# Patient Record
Sex: FEMALE | Race: WHITE | NOT HISPANIC OR LATINO | Employment: PART TIME | URBAN - METROPOLITAN AREA
[De-identification: names, ages, dates, MRNs, and addresses within clinical notes are randomized per-mention and may not be internally consistent; named-entity substitution may affect disease eponyms.]

---

## 2019-05-07 ENCOUNTER — OFFICE VISIT (OUTPATIENT)
Dept: OBGYN CLINIC | Age: 59
End: 2019-05-07
Payer: COMMERCIAL

## 2019-05-07 VITALS
BODY MASS INDEX: 27.11 KG/M2 | SYSTOLIC BLOOD PRESSURE: 120 MMHG | HEIGHT: 63 IN | WEIGHT: 153 LBS | DIASTOLIC BLOOD PRESSURE: 72 MMHG

## 2019-05-07 DIAGNOSIS — Z12.11 ENCOUNTER FOR SCREENING COLONOSCOPY: ICD-10-CM

## 2019-05-07 DIAGNOSIS — Z01.419 ENCOUNTER FOR GYNECOLOGICAL EXAMINATION WITHOUT ABNORMAL FINDING: Primary | ICD-10-CM

## 2019-05-07 DIAGNOSIS — Z78.0 ASYMPTOMATIC POSTMENOPAUSAL STATUS: ICD-10-CM

## 2019-05-07 PROCEDURE — 99386 PREV VISIT NEW AGE 40-64: CPT | Performed by: NURSE PRACTITIONER

## 2019-05-07 PROCEDURE — 87624 HPV HI-RISK TYP POOLED RSLT: CPT | Performed by: NURSE PRACTITIONER

## 2019-05-07 PROCEDURE — G0145 SCR C/V CYTO,THINLAYER,RESCR: HCPCS | Performed by: NURSE PRACTITIONER

## 2019-05-08 LAB
HPV HR 12 DNA CVX QL NAA+PROBE: NEGATIVE
HPV16 DNA CVX QL NAA+PROBE: NEGATIVE
HPV18 DNA CVX QL NAA+PROBE: NEGATIVE

## 2019-05-09 LAB
LAB AP GYN PRIMARY INTERPRETATION: NORMAL
Lab: NORMAL

## 2019-05-10 ENCOUNTER — TELEPHONE (OUTPATIENT)
Dept: GASTROENTEROLOGY | Facility: CLINIC | Age: 59
End: 2019-05-10

## 2019-05-31 ENCOUNTER — TELEPHONE (OUTPATIENT)
Dept: GASTROENTEROLOGY | Facility: CLINIC | Age: 59
End: 2019-05-31

## 2019-05-31 RX ORDER — FLUTICASONE PROPIONATE 50 MCG
1 SPRAY, SUSPENSION (ML) NASAL DAILY
COMMUNITY
End: 2021-12-14 | Stop reason: ALTCHOICE

## 2019-06-03 ENCOUNTER — ANESTHESIA EVENT (OUTPATIENT)
Dept: GASTROENTEROLOGY | Facility: HOSPITAL | Age: 59
End: 2019-06-03

## 2019-06-03 ENCOUNTER — ANESTHESIA (OUTPATIENT)
Dept: GASTROENTEROLOGY | Facility: HOSPITAL | Age: 59
End: 2019-06-03

## 2019-06-03 ENCOUNTER — HOSPITAL ENCOUNTER (OUTPATIENT)
Dept: GASTROENTEROLOGY | Facility: HOSPITAL | Age: 59
Setting detail: OUTPATIENT SURGERY
Discharge: HOME | End: 2019-06-03
Attending: INTERNAL MEDICINE | Admitting: INTERNAL MEDICINE
Payer: COMMERCIAL

## 2019-06-03 VITALS
RESPIRATION RATE: 20 BRPM | BODY MASS INDEX: 24.94 KG/M2 | HEART RATE: 54 BPM | OXYGEN SATURATION: 96 % | TEMPERATURE: 97.7 F | HEIGHT: 65 IN | SYSTOLIC BLOOD PRESSURE: 108 MMHG | WEIGHT: 149.69 LBS | DIASTOLIC BLOOD PRESSURE: 65 MMHG

## 2019-06-03 DIAGNOSIS — Z12.11 SCREENING FOR COLON CANCER: ICD-10-CM

## 2019-06-03 PROCEDURE — 45385 COLONOSCOPY W/LESION REMOVAL: CPT | Performed by: INTERNAL MEDICINE

## 2019-06-03 PROCEDURE — 88305 TISSUE EXAM BY PATHOLOGIST: CPT | Performed by: PATHOLOGY

## 2019-06-03 PROCEDURE — 45380 COLONOSCOPY AND BIOPSY: CPT | Performed by: INTERNAL MEDICINE

## 2019-06-03 RX ORDER — GLYCOPYRROLATE 0.2 MG/ML
INJECTION INTRAMUSCULAR; INTRAVENOUS AS NEEDED
Status: DISCONTINUED | OUTPATIENT
Start: 2019-06-03 | End: 2019-06-03 | Stop reason: SURG

## 2019-06-03 RX ORDER — PROPOFOL 10 MG/ML
INJECTION, EMULSION INTRAVENOUS AS NEEDED
Status: DISCONTINUED | OUTPATIENT
Start: 2019-06-03 | End: 2019-06-03 | Stop reason: SURG

## 2019-06-03 RX ORDER — SODIUM CHLORIDE, SODIUM LACTATE, POTASSIUM CHLORIDE, CALCIUM CHLORIDE 600; 310; 30; 20 MG/100ML; MG/100ML; MG/100ML; MG/100ML
INJECTION, SOLUTION INTRAVENOUS CONTINUOUS PRN
Status: DISCONTINUED | OUTPATIENT
Start: 2019-06-03 | End: 2019-06-03 | Stop reason: SURG

## 2019-06-03 RX ORDER — LIDOCAINE HYDROCHLORIDE 10 MG/ML
INJECTION, SOLUTION INFILTRATION; PERINEURAL AS NEEDED
Status: DISCONTINUED | OUTPATIENT
Start: 2019-06-03 | End: 2019-06-03 | Stop reason: SURG

## 2019-06-03 RX ADMIN — PROPOFOL 20 MG: 10 INJECTION, EMULSION INTRAVENOUS at 10:30

## 2019-06-03 RX ADMIN — PROPOFOL 20 MG: 10 INJECTION, EMULSION INTRAVENOUS at 10:26

## 2019-06-03 RX ADMIN — LIDOCAINE HYDROCHLORIDE 20 MG: 10 INJECTION, SOLUTION INFILTRATION; PERINEURAL at 10:19

## 2019-06-03 RX ADMIN — PROPOFOL 110 MG: 10 INJECTION, EMULSION INTRAVENOUS at 10:20

## 2019-06-03 RX ADMIN — SODIUM CHLORIDE, SODIUM LACTATE, POTASSIUM CHLORIDE, AND CALCIUM CHLORIDE: .6; .31; .03; .02 INJECTION, SOLUTION INTRAVENOUS at 09:57

## 2019-06-03 RX ADMIN — GLYCOPYRROLATE 0.1 MG: 0.2 INJECTION, SOLUTION INTRAMUSCULAR; INTRAVENOUS at 10:24

## 2019-06-03 RX ADMIN — PROPOFOL 30 MG: 10 INJECTION, EMULSION INTRAVENOUS at 10:23

## 2019-06-05 ENCOUNTER — TELEPHONE (OUTPATIENT)
Dept: GASTROENTEROLOGY | Facility: CLINIC | Age: 59
End: 2019-06-05

## 2021-12-14 ENCOUNTER — ANNUAL EXAM (OUTPATIENT)
Dept: OBGYN CLINIC | Age: 61
End: 2021-12-14
Payer: COMMERCIAL

## 2021-12-14 VITALS
SYSTOLIC BLOOD PRESSURE: 114 MMHG | BODY MASS INDEX: 24.83 KG/M2 | HEIGHT: 65 IN | WEIGHT: 149 LBS | DIASTOLIC BLOOD PRESSURE: 78 MMHG

## 2021-12-14 DIAGNOSIS — Z86.010 ENCOUNTER FOR COLONOSCOPY DUE TO HISTORY OF COLONIC POLYP: ICD-10-CM

## 2021-12-14 DIAGNOSIS — Z12.31 SCREENING MAMMOGRAM FOR BREAST CANCER: ICD-10-CM

## 2021-12-14 DIAGNOSIS — Z01.419 ENCOUNTER FOR GYNECOLOGICAL EXAMINATION WITHOUT ABNORMAL FINDING: Primary | ICD-10-CM

## 2021-12-14 DIAGNOSIS — Z78.0 ASYMPTOMATIC POSTMENOPAUSAL STATUS: ICD-10-CM

## 2021-12-14 DIAGNOSIS — Z12.11 ENCOUNTER FOR COLONOSCOPY DUE TO HISTORY OF COLONIC POLYP: ICD-10-CM

## 2021-12-14 PROBLEM — H52.222 REGULAR ASTIGMATISM, LEFT EYE: Status: ACTIVE | Noted: 2021-12-14

## 2021-12-14 PROBLEM — H25.819 COMBINED FORMS OF AGE-RELATED CATARACT: Status: ACTIVE | Noted: 2019-04-08

## 2021-12-14 PROCEDURE — 99396 PREV VISIT EST AGE 40-64: CPT | Performed by: NURSE PRACTITIONER

## 2021-12-14 PROCEDURE — 0503F POSTPARTUM CARE VISIT: CPT | Performed by: NURSE PRACTITIONER

## 2021-12-14 RX ORDER — ATORVASTATIN CALCIUM 10 MG/1
TABLET, FILM COATED ORAL
COMMUNITY
Start: 2021-12-01 | End: 2021-12-14 | Stop reason: ALTCHOICE

## 2021-12-14 RX ORDER — DIFLUPREDNATE 0.5 MG/ML
EMULSION OPHTHALMIC
COMMUNITY
Start: 2019-04-08 | End: 2021-12-14 | Stop reason: ALTCHOICE

## 2022-05-03 ENCOUNTER — PREP FOR PROCEDURE (OUTPATIENT)
Dept: GASTROENTEROLOGY | Facility: CLINIC | Age: 62
End: 2022-05-03

## 2022-05-03 ENCOUNTER — TELEPHONE (OUTPATIENT)
Dept: GASTROENTEROLOGY | Facility: CLINIC | Age: 62
End: 2022-05-03

## 2022-05-03 DIAGNOSIS — Z86.010 HISTORY OF COLON POLYPS: Primary | ICD-10-CM

## 2022-05-03 NOTE — TELEPHONE ENCOUNTER
Patient called would like to be a direct schedule  Patient is having no issues at this time  Patient on the 3 year recall   Please call [phone] ty

## 2022-05-03 NOTE — TELEPHONE ENCOUNTER
Spoke with the patient and she scheduled her colonoscopy  Prep instructions mailed to her home  Scheduled date of colonoscopy (as of today):6/20/22  Physician performing colonoscopy: Jude  Location of colonoscopy:[facility]  Bowel prep reviewed with patient:miralax/dulcolax  Instructions reviewed with patient by:Zuri MARTINEZ  Clearances: none      05/03/22  Screened by: Maricruz Bone    Referring Provider     Pre- Screening: There is no height or weight on file to calculate BMI  Has patient been referred for a routine screening Colonoscopy? yes  Is the patient between 39-70 years old? yes      Previous Colonoscopy yes   If yes:    Date: 06/3/19    Facility: [facility]    Reason:       SCHEDULING STAFF: If the patient is between 45yrs-49yrs, please advise patient to confirm benefits/coverage with their insurance company for a routine screening colonoscopy, some insurance carriers will only cover at [address] or older  If the patient is over 66years old, please schedule an office visit  Does the patient want to see a Gastroenterologist prior to their procedure OR are they having any GI symptoms? no    Has the patient been hospitalized or had abdominal surgery in the past 6 months? no    Does the patient use supplemental oxygen? no    Does the patient take Coumadin, Lovenox, Plavix, Elliquis, Xarelto, or other blood thinning medication? no    Has the patient had a stroke, cardiac event, or stent placed in the past year? no    SCHEDULING STAFF: If patient answers NO to above questions, then schedule procedure  If patient answers YES to above questions, then schedule office appointment  If patient is between 45yrs - 49yrs, please advise patient that we will have to confirm benefits & coverage with their insurance company for a routine screening colonoscopy

## 2022-06-01 ENCOUNTER — TELEPHONE (OUTPATIENT)
Dept: GASTROENTEROLOGY | Facility: CLINIC | Age: 62
End: 2022-06-01

## 2022-07-18 ENCOUNTER — HOSPITAL ENCOUNTER (OUTPATIENT)
Dept: GASTROENTEROLOGY | Facility: HOSPITAL | Age: 62
Setting detail: OUTPATIENT SURGERY
Discharge: HOME | End: 2022-07-18
Attending: INTERNAL MEDICINE | Admitting: INTERNAL MEDICINE
Payer: COMMERCIAL

## 2022-07-18 ENCOUNTER — ANESTHESIA (OUTPATIENT)
Dept: GASTROENTEROLOGY | Facility: HOSPITAL | Age: 62
End: 2022-07-18

## 2022-07-18 ENCOUNTER — ANESTHESIA EVENT (OUTPATIENT)
Dept: GASTROENTEROLOGY | Facility: HOSPITAL | Age: 62
End: 2022-07-18

## 2022-07-18 VITALS
BODY MASS INDEX: 24.65 KG/M2 | OXYGEN SATURATION: 97 % | HEIGHT: 65 IN | HEART RATE: 53 BPM | DIASTOLIC BLOOD PRESSURE: 63 MMHG | SYSTOLIC BLOOD PRESSURE: 108 MMHG | WEIGHT: 147.93 LBS | RESPIRATION RATE: 19 BRPM | TEMPERATURE: 97.2 F

## 2022-07-18 DIAGNOSIS — Z86.010 HISTORY OF COLON POLYPS: ICD-10-CM

## 2022-07-18 PROCEDURE — 88305 TISSUE EXAM BY PATHOLOGIST: CPT | Performed by: PATHOLOGY

## 2022-07-18 PROCEDURE — 45385 COLONOSCOPY W/LESION REMOVAL: CPT | Performed by: INTERNAL MEDICINE

## 2022-07-18 RX ORDER — LIDOCAINE HYDROCHLORIDE 10 MG/ML
INJECTION, SOLUTION EPIDURAL; INFILTRATION; INTRACAUDAL; PERINEURAL AS NEEDED
Status: DISCONTINUED | OUTPATIENT
Start: 2022-07-18 | End: 2022-07-18

## 2022-07-18 RX ORDER — SODIUM CHLORIDE, SODIUM LACTATE, POTASSIUM CHLORIDE, CALCIUM CHLORIDE 600; 310; 30; 20 MG/100ML; MG/100ML; MG/100ML; MG/100ML
INJECTION, SOLUTION INTRAVENOUS CONTINUOUS PRN
Status: DISCONTINUED | OUTPATIENT
Start: 2022-07-18 | End: 2022-07-18

## 2022-07-18 RX ORDER — PROPOFOL 10 MG/ML
INJECTION, EMULSION INTRAVENOUS AS NEEDED
Status: DISCONTINUED | OUTPATIENT
Start: 2022-07-18 | End: 2022-07-18

## 2022-07-18 RX ADMIN — PROPOFOL 30 MG: 10 INJECTION, EMULSION INTRAVENOUS at 12:35

## 2022-07-18 RX ADMIN — PROPOFOL 100 MG: 10 INJECTION, EMULSION INTRAVENOUS at 12:30

## 2022-07-18 RX ADMIN — PROPOFOL 20 MG: 10 INJECTION, EMULSION INTRAVENOUS at 12:41

## 2022-07-18 RX ADMIN — LIDOCAINE HYDROCHLORIDE 20 MG: 10 INJECTION, SOLUTION EPIDURAL; INFILTRATION; INTRACAUDAL; PERINEURAL at 12:30

## 2022-07-18 RX ADMIN — PROPOFOL 20 MG: 10 INJECTION, EMULSION INTRAVENOUS at 12:32

## 2022-07-18 RX ADMIN — PROPOFOL 30 MG: 10 INJECTION, EMULSION INTRAVENOUS at 12:38

## 2022-07-18 RX ADMIN — SODIUM CHLORIDE, SODIUM LACTATE, POTASSIUM CHLORIDE, AND CALCIUM CHLORIDE: .6; .31; .03; .02 INJECTION, SOLUTION INTRAVENOUS at 12:20

## 2022-07-18 NOTE — ANESTHESIA PREPROCEDURE EVALUATION
Procedure:  COLONOSCOPY         Physical Exam    Airway    Mallampati score: III  TM Distance: >3 FB  Neck ROM: full     Dental       Cardiovascular  Cardiovascular exam normal    Pulmonary  Pulmonary exam normal     Other Findings     Patient called would like to be a direct schedule  Patient is having no issues at this time  Patient on the 3 year recall  Anesthesia Plan  ASA Score- 2     Anesthesia Type- IV sedation with anesthesia with ASA Monitors  Additional Monitors:   Airway Plan:           Plan Factors-Exercise tolerance (METS): >4 METS  Chart reviewed  Existing labs reviewed  Patient summary reviewed  Patient is a current smoker  Induction- intravenous  Postoperative Plan-     Informed Consent- Anesthetic plan and risks discussed with patient  I personally reviewed this patient with the CRNA  Discussed and agreed on the Anesthesia Plan with the CRNA                  Surgical History     Current as of 07/18/22 1131  TOTAL HIP ARTHROPLASTY TUBAL LIGATION   TONSILLECTOMY      Substance History     Current as of 07/18/22 1131  Smoking Status: Current Some Day Smoker - 8 75 pack years   Smokeless Tobacco Status: Never Used   Alcohol use: Never   Drug use: Never     Problem List     Current as of 07/18/22 1131  Asymptomatic postmenopausal status   Combined forms of age-related cataract   Regular astigmatism, left eye

## 2022-07-18 NOTE — ANESTHESIA POSTPROCEDURE EVALUATION
Post-Op Assessment Note    CV Status:  Stable  Pain Score: 0    Pain management: adequate     Mental Status:  Arousable and sleepy   Hydration Status:  Euvolemic   PONV Controlled:  Controlled   Airway Patency:  Patent      Post Op Vitals Reviewed: Yes      Staff: CRNA         No complications documented      BP   105/56   Temp      Pulse 70   Resp 18   SpO2 98% RA

## 2022-07-18 NOTE — H&P
History and Physical - [facility]  [facility] 58 y o  female MRN: [id number]                  HPI: [facility] is a 58y o  year old female who presents for colonoscopy for history of colon polyps      REVIEW OF SYSTEMS: Per the HPI, and otherwise unremarkable  Historical Information   Past Medical History:   Diagnosis Date    No known health problems      Past Surgical History:   Procedure Laterality Date    TONSILLECTOMY      TOTAL HIP ARTHROPLASTY Right 02/09/2011    TUBAL LIGATION       Social History   Social History     Substance and Sexual Activity   Alcohol Use Never     Social History     Substance and Sexual Activity   Drug Use Never     Social History     Tobacco Use   Smoking Status Current Some Day Smoker    Packs/day: 0 25    Years: 35 00    Pack years: 8 75    Types: Cigarettes   Smokeless Tobacco Never Used     Family History   Problem Relation Age of Onset    Breast cancer Neg Hx     Colon cancer Neg Hx     Ovarian cancer Neg Hx     Uterine cancer Neg Hx     Cervical cancer Neg Hx        Meds/Allergies     (Not in a hospital admission)      Allergies   Allergen Reactions    Penicillins      Other reaction(s): Unknown Reaction  hives         Objective     Blood pressure 104/58, pulse 67, temperature 97 9 °F (36 6 °C), temperature source Temporal, resp  rate 18, height 5' 5" (1 651 m), weight 67 1 kg (147 lb 14 9 oz), SpO2 96 %, not currently breastfeeding  PHYSICAL EXAM    /58   Pulse 67   Temp 97 9 °F (36 6 °C) (Temporal)   Resp 18   Ht 5' 5" (1 651 m)   Wt 67 1 kg (147 lb 14 9 oz)   SpO2 96%   BMI 24 62 kg/m²       Gen: NAD  CV: RRR  CHEST: Clear  ABD: soft, NT/ND  EXT: no edema      ASSESSMENT/PLAN:  This is a 58y o  year old female here for colonoscopy, and she is stable and optimized for her procedure

## 2022-08-01 ENCOUNTER — TELEPHONE (OUTPATIENT)
Dept: GASTROENTEROLOGY | Facility: CLINIC | Age: 62
End: 2022-08-01

## 2022-08-01 NOTE — TELEPHONE ENCOUNTER
----- Message from Matthew Virgen PA-C sent at 8/1/2022  8:50 AM EDT -----  Please let patient know that Dr Haylee Miner completely removed precancerous polyps from the colon  She has recommended a 3 year recall for her colonoscopy  Thank you

## 2022-08-01 NOTE — TELEPHONE ENCOUNTER
Called and spoke to patient   Gave patient test results  Patient voiced understanding and had no further questions or concerns

## 2023-01-10 DIAGNOSIS — Z12.31 SCREENING MAMMOGRAM FOR BREAST CANCER: ICD-10-CM

## 2023-03-18 ENCOUNTER — APPOINTMENT (OUTPATIENT)
Dept: RADIOLOGY | Facility: CLINIC | Age: 63
End: 2023-03-18

## 2023-03-18 ENCOUNTER — OFFICE VISIT (OUTPATIENT)
Dept: OBGYN CLINIC | Facility: CLINIC | Age: 63
End: 2023-03-18

## 2023-03-18 VITALS
DIASTOLIC BLOOD PRESSURE: 76 MMHG | BODY MASS INDEX: 25.33 KG/M2 | HEIGHT: 65 IN | SYSTOLIC BLOOD PRESSURE: 111 MMHG | RESPIRATION RATE: 18 BRPM | OXYGEN SATURATION: 97 % | WEIGHT: 152 LBS | HEART RATE: 81 BPM

## 2023-03-18 DIAGNOSIS — M54.50 ACUTE LOW BACK PAIN, UNSPECIFIED BACK PAIN LATERALITY, UNSPECIFIED WHETHER SCIATICA PRESENT: ICD-10-CM

## 2023-03-18 DIAGNOSIS — M53.3 SI (SACROILIAC) JOINT DYSFUNCTION: Primary | ICD-10-CM

## 2023-03-18 NOTE — PROGRESS NOTES
Assessment/Plan:    No problem-specific Assessment & Plan notes found for this encounter  Diagnoses and all orders for this visit:    SI (sacroiliac) joint dysfunction  -     XR spine lumbar minimum 4 views non injury; Future  -     Ambulatory Referral to Pain Management; Future  -     Ambulatory Referral to Physical Therapy; Future    Other orders  -     Multiple Vitamins-Minerals (MENS 50+ MULTI VITAMIN/MIN PO); Take by mouth  -     Cholecalciferol (VITAMIN D3 PO); Take by mouth  -     Calcium-Magnesium-Vitamin D (CALCIUM MAGNESIUM PO); Take by mouth     Radiographs of the lumbar spine reviewed independently in office today degenerative changes were noted in the SI joint  Accentuation of nutation of the sacrum was noted  Follow-up on official reading  Clinical impression is that patient is symptomatic from SI joint dysfunction affecting the left joint  We discussed the recommended treatment plan and I am advising patient follow-up with physical therapy and consultation with spine and pain for possible SI joint injection  I advised patient that I would recommend following up with her again in about 6 to 8 weeks for reevaluation  If her pain symptoms continue persist we can consider advanced imaging for further evaluation  Subjective:      Patient ID: Francisco Whatley is a 61 y o  female presenting for initial evaluation in regards to lower back pain  Patient states that the pain began in September and originated in the lumbar spine and radiated down into the left gluteal region  She was seen by her chiropractor and had about 3-4 adjustments and states that the pain did resolve  She was advised by her Doctor to avoid any exercises during the month that she was receiving adjustments  She states that approximately 3 weeks after resuming exercise activity she started developing pain symptoms that fail to improve  Pain symptoms have been ongoing since November    States that it feels like a dull ache that begins in the left lower lumbar region and radiates into the left gluteal region specifically worsened with external rotation of the hip    She denies any numbness or tingling no bowel or bladder incontinence and no weakness in the lower extremity    HPI    The following portions of the patient's history were reviewed and updated as appropriate: allergies, current medications, past family history, past medical history, past social history, past surgical history and problem list     Review of Systems      Objective:      /76   Pulse 81   Resp 18   Ht 5' 5" (1 651 m)   Wt 68 9 kg (152 lb)   SpO2 97%   BMI 25 29 kg/m²          Physical Exam      Back exam  No gross abnormality  No skin changes open wounds or ecchymosis  There is no tenderness to palpation over the spinous processes of the lumbar spine and no tenderness palpation of the paraspinals of the lumbar spine  He does exhibit tenderness to palpation in the left SI joint  EWA testing does elicit reproduction of pain in the left SI joint  She has 5 out of 5 strength in lower extremity  Is able to perform heel and toe walk  No sensory deficits to light touch  Negative straight leg raise on the left

## 2023-03-23 ENCOUNTER — TELEPHONE (OUTPATIENT)
Dept: PAIN MEDICINE | Facility: CLINIC | Age: 63
End: 2023-03-23

## 2023-03-23 NOTE — TELEPHONE ENCOUNTER
Caller: patient     Doctor: Kathryn Jordan    Reason for call: pt has a direct referral from Orthopedics for a procedure   She's requesting to schedule it asap, thx    Call back#: [phone]

## 2023-03-24 ENCOUNTER — TELEPHONE (OUTPATIENT)
Dept: OBGYN CLINIC | Facility: CLINIC | Age: 63
End: 2023-03-24

## 2023-03-24 ENCOUNTER — TELEPHONE (OUTPATIENT)
Dept: OBGYN CLINIC | Facility: HOSPITAL | Age: 63
End: 2023-03-24

## 2023-03-24 NOTE — TELEPHONE ENCOUNTER
Caller: Patient     Doctor/Office: Dr Hobbs     Call regarding :  SPA     Call was transferred to: [facility]

## 2023-03-24 NOTE — TELEPHONE ENCOUNTER
Caller: Patient    Doctor: Bobbi Mcclelland    Reason for call: pt is requesting to schedule her procedure & is checking the status     Call back#: [phone]

## 2023-03-24 NOTE — TELEPHONE ENCOUNTER
Caller: Self    Doctor: Natanael Yip    Reason for call: patient received a message to discuss xray results, returning call    Call back#: [phone]

## 2023-03-27 ENCOUNTER — EVALUATION (OUTPATIENT)
Dept: PHYSICAL THERAPY | Facility: CLINIC | Age: 63
End: 2023-03-27

## 2023-03-27 DIAGNOSIS — M53.3 SI (SACROILIAC) JOINT DYSFUNCTION: Primary | ICD-10-CM

## 2023-03-27 NOTE — PROGRESS NOTES
PT Evaluation     Today's date: 3/27/2023  Patient name: Gypsy Hylton  : 1960  MRN: [id number]  Referring provider: Sophie Broderick DO  Dx:   Encounter Diagnosis     ICD-10-CM    1  SI (sacroiliac) joint dysfunction  M53 3 Ambulatory Referral to Physical Therapy          Start Time: 476  Stop Time: 1800  Total time in clinic (min): 45 minutes    Assessment  Assessment details: Pt is a 60 y/o female presenting to physical therapy with L lumbar and SI pain  Upon examination, pt presents with impairments of decreased strength, decreased ROM, and increased pain of pt's lumbar spine/SI joint resulting in limitations of self-care/ADLs, household/recreational activities, and ambulation  Pt plan of care will focus on improving strength and ROM of pt's lumbar spine/SI joint as well as decreasing pain and improving tolerance to functional activities  Pt would benefit from skilled physical therapy to facilitate a return to her prior level of function  Impairments: abnormal or restricted ROM, activity intolerance, impaired physical strength, lacks appropriate home exercise program and pain with function  Functional limitations: self-care/ADLs, household/recreational activities, and ambulation  Symptom irritability: moderateUnderstanding of Dx/Px/POC: good   Prognosis: good    Goals  STG - 4 weeks  1  Pt will have a subjective decrease in pain of her SI joint by 2 levels or more during ambulation  2  Pt will demonstrate independence with her HEP    LTG - 8 weeks  1  Pt will demonstrate 4/5 gross strength or better of her core and B hips in all planes to facilitate a return to her prior level of function  2  Pt's FOTO score will improve from (initial score) to (final predicted score) or better to facilitate a return to pt's prior level of function        Plan  Patient would benefit from: PT eval and skilled physical therapy  Planned modality interventions: cryotherapy, thermotherapy: hydrocollator packs and unattended electrical stimulation  Planned therapy interventions: activity modification, ADL training, abdominal trunk stabilization, body mechanics training, flexibility, functional ROM exercises, gait training, graded exercise, home exercise program, joint mobilization, manual therapy, massage, Muniz taping, neuromuscular re-education, patient education, postural training, self care, strengthening, stretching, therapeutic activities and therapeutic exercise  Frequency: 2x week  Duration in weeks: 8  Plan of Care beginning date: 3/27/2023  Plan of Care expiration date: 2023  Treatment plan discussed with: patient        Subjective Evaluation    History of Present Illness  Mechanism of injury: Pt is a 62 y/o female presenting to physical therapy with L lumbar and SI pain  Pt reports her pain started at the end of October which she did go to see her chiropractor in November which did help  However, the pain gradually returned by the middle of January but she is unsure why  Pt reports she would try and exercise which did not help much and finally she Dr Raya Rodas in March  Pt reports she has a constant on the L side of her back which will worsen with walking and doing pilates  Pt reports she will have decreased pain with ice, aleve, stretching, and sittng upright  Pain  Current pain ratin  At best pain ratin  At worst pain ratin  Location: L lumbar/SI  Quality: dull ache and discomfort  Relieving factors: medications, ice and rest  Aggravating factors: walking  Progression: worsening    Treatments  Previous treatment: medication and chiropractic  Current treatment: medication and physical therapy  Patient Goals  Patient goals for therapy: return to sport/leisure activities  Patient goal: walk without pain        Objective     Tenderness     Left Hip   Tenderness in the PSIS       Active Range of Motion     Lumbar   Flexion: 70 degrees  WFL  Extension: 20 degrees  Restriction level: moderate  Left "lateral flexion: 25 degrees    WFL  Right lateral flexion: 25 degrees  WFL  Mechanical Assessment    Cervical      Thoracic    Lying extension:   Pain intensity: better    Lumbar      Strength/Myotome Testing     Left Hip   Planes of Motion   Flexion: 4-  Extension: 3+  Abduction: 4-  Adduction: 4-  External rotation: 3+  Internal rotation: 3+    Right Hip   Planes of Motion   Flexion: 4-  Extension: 3+  Abduction: 4-  Adduction: 4-  External rotation: 4-  Internal rotation: 4-    Left Knee   Flexion: 4  Extension: 4    Right Knee   Flexion: 4  Extension: 4    General Comments:      Lumbar Comments  Pt demonstrated an upslip of her L ASIS and PSIS causing her LLE to be shorter than her RLE    Upslip corrected after providing long axis distraction to pt's LLE    Core strength: 3/5             Precautions: N/A      Manuals 3/27            Long axis distraction L hip GENOVEVA            PSIS megan TOMAS                                      Neuro Re-Ed             Education on POC, diagnosis, and HEP 8'                                                                                          Ther Ex             Prone hip ext 1x10 3\"            TA 1x10                                                                                          Ther Activity                                       Gait Training                                       Modalities                                          "

## 2023-03-27 NOTE — TELEPHONE ENCOUNTER
S/w pt scheduled SI joint 4/20/23 @  1pm, reviewed all procedural instructions, including /vaccination policy

## 2023-04-03 ENCOUNTER — OFFICE VISIT (OUTPATIENT)
Dept: PHYSICAL THERAPY | Facility: CLINIC | Age: 63
End: 2023-04-03

## 2023-04-03 DIAGNOSIS — M53.3 SI (SACROILIAC) JOINT DYSFUNCTION: Primary | ICD-10-CM

## 2023-04-03 NOTE — PROGRESS NOTES
"Daily Note     Today's date: 4/3/2023  Patient name: Myla Orr  : 1960  MRN: [id number]  Referring provider: Krista Penaloza DO  Dx:   Encounter Diagnosis     ICD-10-CM    1  SI (sacroiliac) joint dysfunction  M53 3           Start Time: 1100  Stop Time: 1147  Total time in clinic (min): 47 minutes    Subjective: Pt reports having decreased pain in her SI after her initial evaluation  Objective: See treatment diary below      Assessment: Tolerated treatment well  Patient demonstrated fatigue post treatment, exhibited good technique with therapeutic exercises and would benefit from continued PT  Pt had significant limitations in mobility of her L hip in the planes of abduction and IR, pt also had increased pain with PROM hip IR  Pt had decreased pain after performance SI gapping and repeated hip extension at today's session  Pt required min verbal cues to facilitate performance of proper technique  Assess pt response to treatment at next visit  Plan: Continue per plan of care  Precautions: N/A      Manuals 3/27 4/3           Long axis distraction L hip GENOVEVA            PSIS rocking GENOVEVA GENOVEVA           sidelying SI gapping  GENOVEVA           PROM hip IR  GENOVEVA           Hip add stretching  GENOVEVA           Neuro Re-Ed             Pt education 8' 4'            c hip add  2x10           TA  1x10           TA c march  1x10 ea           Up up down down  1x10 p!                                      Ther Ex             Prone hip ext 1x10 3\" 1x10 5\" ea           Prone press up  1x10           Half kneeling repeated hip extension  1x10           Reverse clamshell  2x10                                                               Ther Activity                                       Gait Training                                       Modalities                                            "

## 2023-04-06 ENCOUNTER — OFFICE VISIT (OUTPATIENT)
Dept: PHYSICAL THERAPY | Facility: CLINIC | Age: 63
End: 2023-04-06

## 2023-04-06 DIAGNOSIS — M53.3 SI (SACROILIAC) JOINT DYSFUNCTION: Primary | ICD-10-CM

## 2023-04-06 NOTE — PROGRESS NOTES
"Daily Note     Today's date: 2023  Patient name: Raymond Martinez  : 1960  MRN: [id number]  Referring provider: Saige Eldridge DO  Dx:   Encounter Diagnosis     ICD-10-CM    1  SI (sacroiliac) joint dysfunction  M53 3           Start Time: 1100  Stop Time: 6612  Total time in clinic (min): 45 minutes    Subjective: Pt reports she is feeling better and had no pain in her SI/low back last night  Objective: See treatment diary below      Assessment: Tolerated treatment well  Patient demonstrated fatigue post treatment, exhibited good technique with therapeutic exercises and would benefit from continued PT  Pt was able to progress today with inclusion of paloff press and sidestepping into pt's exercise program  Pt had improved hip IR and decreased pain during performance of hip PROM  Pt required min verbal cues to facilitate performance of proper technique  Assess pt response to treatment at next visit  Plan: Continue per plan of care        Precautions: N/A      Manuals 3/27 4/3 4/6          Long axis distraction L hip GENOVEVA  GENOVEVA          PSIS rocking GENOVEVA GENOVEVA GENOVEVA          sidelying SI gapping  GENOVEVA GENOVEVA          PROM hip IR  GENOVEVA GENOVEVA          Hip add stretching  GENOVEVA GENOVEVA          Neuro Re-Ed             Pt education 8' 4' 4'          Bridges c hip add  2x10 2x10          TA  1x10 1x10          TA c march  1x10 ea 1x15 ea          Up up down down  1x10 p!           paloff press   1x5 10\" blue                       Ther Ex             Prone hip ext 1x10 3\" 1x10 5\" ea 1x15 5\" ea          Prone press up  1x10 2x10          Half kneeling repeated hip extension  1x10 2x10          Reverse clamshell  2x10 2x10          Treadmill for LE strength and endurance   5'          Sidestepping at bar   2x grn                                    Ther Activity                                       Gait Training                                       Modalities                                            "

## 2023-04-20 ENCOUNTER — HOSPITAL ENCOUNTER (OUTPATIENT)
Dept: RADIOLOGY | Facility: CLINIC | Age: 63
Discharge: HOME | End: 2023-04-20

## 2023-04-20 VITALS
DIASTOLIC BLOOD PRESSURE: 78 MMHG | HEART RATE: 74 BPM | RESPIRATION RATE: 20 BRPM | SYSTOLIC BLOOD PRESSURE: 121 MMHG | TEMPERATURE: 97 F | OXYGEN SATURATION: 95 %

## 2023-04-20 DIAGNOSIS — M53.3 SI (SACROILIAC) JOINT DYSFUNCTION: ICD-10-CM

## 2023-04-20 RX ORDER — METHYLPREDNISOLONE ACETATE 40 MG/ML
40 INJECTION, SUSPENSION INTRA-ARTICULAR; INTRALESIONAL; INTRAMUSCULAR; PARENTERAL; SOFT TISSUE ONCE
Status: COMPLETED | OUTPATIENT
Start: 2023-04-20 | End: 2023-04-20

## 2023-04-20 RX ORDER — BUPIVACAINE HCL/PF 2.5 MG/ML
1 VIAL (ML) INJECTION ONCE
Status: COMPLETED | OUTPATIENT
Start: 2023-04-20 | End: 2023-04-20

## 2023-04-20 RX ADMIN — Medication 1 ML: at 13:13

## 2023-04-20 RX ADMIN — METHYLPREDNISOLONE ACETATE 40 MG: 40 INJECTION, SUSPENSION INTRA-ARTICULAR; INTRALESIONAL; INTRAMUSCULAR; PARENTERAL; SOFT TISSUE at 13:13

## 2023-04-20 RX ADMIN — IOHEXOL 0.5 ML: 300 INJECTION, SOLUTION INTRAVENOUS at 13:13

## 2023-04-20 NOTE — DISCHARGE INSTR - LAB
Steroid Joint Injection   WHAT YOU NEED TO KNOW:   A steroid joint injection is a procedure to inject steroid medicine into a joint  Steroid medicine decreases pain and inflammation  The injection may also contain an anesthetic (numbing medicine) to decrease pain  It may be done to treat conditions such as arthritis, gout, or carpal tunnel syndrome  The injections may be given in your knee, ankle, shoulder, elbow, wrist, ankle or sacroiliac joint  Do not apply heat to any area that is numb  If you have discomfort or soreness at the injection site, you may apply ice today, 20 minutes on and 20 minutes off  Tomorrow you may use ice or warm, moist heat  Do not apply ice or heat directly to the skin  You may have an increase or change in the discomfort for 36-48 hours after your treatment  Apply ice and continue with any pain medicine you have been prescribed  Do not do anything strenuous today  You may shower, but no tub baths or hot tubs today  You may resume your normal activities tomorrow, but do not “overdo it”  Resume normal activities slowly when you are feeling better  If you experience redness, drainage or swelling at the injection site, or if you develop a fever above 100 degrees, please call [facility] at [phone] or go to the Emergency Room  Continue to take all routine medicines prescribed by your primary care physician unless otherwise instructed by our staff  Most blood thinners should be started again according to your regularly scheduled dosing  If you have any questions, please give our office a call  As no general anesthesia was used in today's procedure, you should not experience any side effects related to anesthesia  If you are diabetic, the steroids used in today's injection may temporarily increase your blood sugar levels after the first few days after your injection   Please keep a close eye on your sugars and alert the doctor who manages your diabetes if your sugars are significantly high from your baseline or you are symptomatic  If you have a problem specifically related to your procedure, please call our office at [phone]  Problems not related to your procedure should be directed to your primary care physician

## 2023-04-20 NOTE — H&P
History of Present Illness: The patient is a 61 y o  female who presents with complaints of left low back pain and buttock pain    Past Medical History:   Diagnosis Date   • No known health problems        Past Surgical History:   Procedure Laterality Date   • TONSILLECTOMY     • TOTAL HIP ARTHROPLASTY Right 02/09/2011   • TUBAL LIGATION           Current Outpatient Medications:   •  Calcium-Magnesium-Vitamin D (CALCIUM MAGNESIUM PO), Take by mouth, Disp: , Rfl:   •  Cholecalciferol (VITAMIN D3 PO), Take by mouth, Disp: , Rfl:   •  Multiple Vitamins-Minerals (MENS 50+ MULTI VITAMIN/MIN PO), Take by mouth, Disp: , Rfl:     Allergies   Allergen Reactions   • Penicillins      Other reaction(s): Unknown Reaction  hives         Physical Exam: There were no vitals filed for this visit  General: Awake, Alert, Oriented x 3, Mood and affect appropriate  Respiratory: Respirations even and unlabored  Cardiovascular: Peripheral pulses intact; no edema  Musculoskeletal Exam: ttp left SI joint    ASA Score: 3         Assessment:   1  SI (sacroiliac) joint dysfunction        Plan:    Left SI joint injection

## 2023-04-24 ENCOUNTER — OFFICE VISIT (OUTPATIENT)
Dept: PHYSICAL THERAPY | Facility: CLINIC | Age: 63
End: 2023-04-24

## 2023-04-24 DIAGNOSIS — M53.3 SI (SACROILIAC) JOINT DYSFUNCTION: Primary | ICD-10-CM

## 2023-04-24 NOTE — PROGRESS NOTES
"Daily Note     Today's date: 2023  Patient name: Coretta Murray  : 1960  MRN: [id number]  Referring provider: Viviana Rick DO  Dx:   Encounter Diagnosis     ICD-10-CM    1  SI (sacroiliac) joint dysfunction  M53 3           Start Time: 1545  Stop Time: 1630  Total time in clinic (min): 45 minutes    Subjective: Pt reports receiving her SI injection last Thursday and having decreased pain at the start of therapy today  Objective: See treatment diary below      Assessment: Tolerated treatment well  Patient demonstrated fatigue post treatment, exhibited good technique with therapeutic exercises and would benefit from continued PT  Pt was able to progress today with inclusion of STS and standing hip abduction with resistance into pt's exercise program  Pt required min verbal cues to facilitate performance of proper technique  Assess pt response to treatment at next visit  Plan: Continue per plan of care  Precautions: N/A      Manuals 3/27 4/3 4/6 4/10 4/13 4/17 4/24      Long axis distraction L hip GENOVEVA  GENOVEVA GENOVEVA GENOVEVA        PSIS rocking GENOVEVA GENOVEVA GENOVEVA GENOVEVA GENOVEVA GENOVEVA GENOVEVA      sidelying SI gapping  GENOVEVA GENOVEVA GENOVEVA         PROM hip IR  GENOVEVA GENOVEVA GENOVEVA         Belted hip IR mobilization    GENOVEVA GENOVEVA GENOVEVA GENOVEVA      Hip add stretching  GENOVEVA GENOVEVA GENOVEVA         Neuro Re-Ed             Pt education 8' 4' 4' 4' 4'  4'      Bridges c hip add  2x10 2x10 2x10 3x10 3x10  3x10      TA  1x10 1x10 1x10  1x10 10\"       TA c march  1x10 ea 1x15 ea          Up up down down  1x10 p!   2x10 2x10 2x10  2x10      Reverse March             palo press   1x5 10\" blue 1x5 10\" blue 1x5 10\" blue NV 1x5 10\" blue                   Ther Ex             Prone hip ext 1x10 3\" 1x10 5\" ea 1x15 5\" ea 2x10 5\" ea 2x10 5\" ea 2x10 5\"  2x10 5\"      Prone press up  1x10 2x10 2x10 2x10 2x10 2x10      Half kneeling repeated hip extension (hip flex str position)  1x10 2x10 2x10 2x10 2x10  2x10      Reverse clamshell  2x10 2x10 3x10 2x10 orange 2x10 [place]ach 2x10 orange      Standing " "hip abd       2x10 grn      Sidestepping at bar   2x grn 2x grn 2x grn 2x grn  3x grn      sidelying hip abd    3x10 3x10 2x15 2x15      Seated hip hinge      1x10       Captain francesca     1x10 5\" 1x10 5\" 2x10 5\"      Ther Activity             STS from chair       2x10                    Gait Training                                       Modalities                                            "

## 2023-04-27 ENCOUNTER — OFFICE VISIT (OUTPATIENT)
Dept: OBGYN CLINIC | Facility: CLINIC | Age: 63
End: 2023-04-27

## 2023-04-27 VITALS
HEART RATE: 80 BPM | OXYGEN SATURATION: 97 % | HEIGHT: 65 IN | SYSTOLIC BLOOD PRESSURE: 112 MMHG | BODY MASS INDEX: 24.99 KG/M2 | DIASTOLIC BLOOD PRESSURE: 74 MMHG | WEIGHT: 150 LBS | RESPIRATION RATE: 18 BRPM

## 2023-04-27 DIAGNOSIS — M51.36 DEGENERATIVE DISC DISEASE, LUMBAR: ICD-10-CM

## 2023-04-27 DIAGNOSIS — M16.12 PRIMARY OSTEOARTHRITIS OF ONE HIP, LEFT: Primary | ICD-10-CM

## 2023-04-27 RX ORDER — VIT C/B6/B5/MAGNESIUM/HERB 173 50-5-6-5MG
CAPSULE ORAL
COMMUNITY

## 2023-04-28 ENCOUNTER — APPOINTMENT (OUTPATIENT)
Dept: PHYSICAL THERAPY | Facility: CLINIC | Age: 63
End: 2023-04-28

## 2023-05-01 ENCOUNTER — OFFICE VISIT (OUTPATIENT)
Dept: PHYSICAL THERAPY | Facility: CLINIC | Age: 63
End: 2023-05-01

## 2023-05-01 DIAGNOSIS — M53.3 SI (SACROILIAC) JOINT DYSFUNCTION: Primary | ICD-10-CM

## 2023-05-01 RX ORDER — BUPIVACAINE HYDROCHLORIDE 2.5 MG/ML
8 INJECTION, SOLUTION INFILTRATION; PERINEURAL
Status: COMPLETED | OUTPATIENT
Start: 2023-05-01 | End: 2023-05-01

## 2023-05-01 RX ORDER — TRIAMCINOLONE ACETONIDE 40 MG/ML
40 INJECTION, SUSPENSION INTRA-ARTICULAR; INTRAMUSCULAR
Status: COMPLETED | OUTPATIENT
Start: 2023-05-01 | End: 2023-05-01

## 2023-05-01 RX ADMIN — TRIAMCINOLONE ACETONIDE 40 MG: 40 INJECTION, SUSPENSION INTRA-ARTICULAR; INTRAMUSCULAR at 14:16

## 2023-05-01 RX ADMIN — BUPIVACAINE HYDROCHLORIDE 8 ML: 2.5 INJECTION, SOLUTION INFILTRATION; PERINEURAL at 14:16

## 2023-05-01 NOTE — PROGRESS NOTES
"Daily Note     Today's date: 2023  Patient name: Salud Cormier  : 1960  MRN: [id number]  Referring provider: Chris Brown DO  Dx:   Encounter Diagnosis     ICD-10-CM    1  SI (sacroiliac) joint dysfunction  M53 3                      Subjective: Patient states she had her injection on her hip on thurs and her back feels better, she is noting some L hip flexor irritation and she wonders if it from the injection  Objective: See treatment diary below      Assessment: Tolerated treatment well  Progressed to reverse marches, she was challenged to maintain 90/90 position but completed with no pain or discomfort  Hold times added to bridges with good effort  L hip flexor discomfort with lateral stepping to R  Cont challenge with reps and resistance of rest of charted program   Patient demonstrated fatigue post treatment and would benefit from continued PT      Plan: Progress treatment as tolerated  Precautions: N/A      Manuals 3/27 4/3 4/6 4/10 4/13 4/17 4/24 5/1     Long axis distraction L hip GENOVEVA  GENOVEVA GENOVEVA GENOVEVA        PSIS rocking GENOVEVA GENOVEVA GENOVEVA GENOVEVA GENOVEVA GENOVEVA GENOVEVA      sidelying SI gapping  GENOVEVA GENOVEVA GENOVEVA         PROM hip IR  GENOVEVA GENOVEVA GENOVEVA         Belted hip IR mobilization    GENOVEVA GENOVEVA GENOVEVA GENOVEVA      Hip add stretching  GENOVEVA GENOVEVA GENOVEVA         Neuro Re-Ed             Pt education 8' 4' 4' 4' 4'  4'      Bridges c hip add  2x10 2x10 2x10 3x10 3x10  3x10 3x10 3\"     TA  1x10 1x10 1x10  1x10 10\"       TA c march  1x10 ea 1x15 ea          Up up down down  1x10 p!   2x10 2x10 2x10  2x10 2x10     90/90 Pullover             Reverse March        1x10 ea     paloff press   1x5 10\" blue 1x5 10\" blue 1x5 10\" blue NV 1x5 10\" blue 1x6 10\" Blue minisquat position                  Ther Ex             Prone hip ext 1x10 3\" 1x10 5\" ea 1x15 5\" ea 2x10 5\" ea 2x10 5\" ea 2x10 5\"  2x10 5\" 2x10 5\" ea alt      Prone press up  1x10 2x10 2x10 2x10 2x10 2x10 2x10     Half kneeling repeated hip extension (hip flex str position)  1x10 2x10 2x10 2x10 2x10  2x10 2x10 " "ea     Reverse clamshell  2x10 2x10 3x10 2x10 orange 2x10 [place]ach 2x10 orange 2x10 ea OTB     Standing hip abd       2x10 grn 2x12 grn      Sidestepping at bar   2x grn 2x grn 2x grn 2x grn  3x grn 3x grn  No UE     sidelying hip abd    3x10 3x10 2x15 2x15      Seated hip hinge      1x10       Captain francesca     1x10 5\" 1x10 5\" 2x10 5\" 2x10 5\"     Ther Activity             STS from chair       2x10  2x10                   Gait Training                                       Modalities                                            "

## 2023-05-01 NOTE — PROGRESS NOTES
"Assessment/Plan:    No problem-specific Assessment & Plan notes found for this encounter  Diagnoses and all orders for this visit:    Primary osteoarthritis of one hip, left    Degenerative disc disease, lumbar    Other orders  -     Turmeric 500 MG CAPS; Take by mouth     We discussed that patient's pain symptoms seem to be primarily originating from lumbar etiology  She does have a component of advanced osteoarthritis involving the left hip and we discussed that this may be a possibility for her pain however more likely due to underlying lumbar osteoarthritis and SI joint degenerative changes  After counseling patient would like to trial a ultrasound-guided hip injection in office today which was performed without complication  I advised patient to follow-up with me again in a few weeks for reevaluation and if pain symptoms continue persist the next step would be to have her see spine pain for evaluation of the lumbar spine  Subjective:      Patient ID: Annabella Fuentes is a 61 y o  female presenting for office visit follow-up for left back hip pain  Patient was referred to physical therapy and referred to pain management for SI joint injection  Patient states the injection did help and physical therapy has been helping additionally  Pain has improved since initial evaluation date\however she still has significant pain in the posterior aspect left side lower lumbar      HPI    The following portions of the patient's history were reviewed and updated as appropriate: allergies, current medications, past family history, past medical history, past social history, past surgical history and problem list     Review of Systems      Objective:      /74   Pulse 80   Resp 18   Ht 5' 5\" (1 651 m)   Wt 68 kg (150 lb)   SpO2 97%   BMI 24 96 kg/m²          Physical Exam    Left hip exam elicits limitations with internal rotation however no pain with this motion  Negative scour  Patient exhibits positive " pain in the SI joint with EWA testing

## 2023-05-01 NOTE — PROGRESS NOTES
Large joint arthrocentesis: L hip joint  Universal Protocol:  Consent: Verbal consent obtained  Risks and benefits: risks, benefits and alternatives were discussed  Consent given by: patient    Supporting Documentation  Indications: pain   Procedure Details  Location: hip - L hip joint  Needle size: 22 G  Ultrasound guidance: yes  Approach: anterolateral  Medications administered: 8 mL bupivacaine 0 25 %; 40 mg triamcinolone acetonide 40 mg/mL    Patient tolerance: patient tolerated the procedure well with no immediate complications    Risks and benefits of CSI were discussed with patient extensively  Risks were highlighted which included but were not limited to infection, pain, local site swelling, and chance that injection may not be effective  Patient was also counseled regarding glucose elevation days after receiving CSI and to be mindful of diet and check sugars daily  Patient agreeable to proceed with CSI after counseling  Ultrasound images were saved to the [facility] and a small video clip showing injectate flowing into the hip joint was also saved  These will be uploaded into the PACS system

## 2023-05-04 ENCOUNTER — OFFICE VISIT (OUTPATIENT)
Dept: PHYSICAL THERAPY | Facility: CLINIC | Age: 63
End: 2023-05-04

## 2023-05-04 DIAGNOSIS — M53.3 SI (SACROILIAC) JOINT DYSFUNCTION: Primary | ICD-10-CM

## 2023-05-04 NOTE — PROGRESS NOTES
"Daily Note     Today's date: 2023  Patient name: Sebastian Ann  : 1960  MRN: [id number]  Referring provider: Army Carter DO  Dx:   Encounter Diagnosis     ICD-10-CM    1  SI (sacroiliac) joint dysfunction  M53 3                      Subjective: ***      Objective: See treatment diary below      Assessment: Tolerated treatment {Tolerated treatment :2994300069}  Patient {assessment:1673131711}      Plan: {PLAN:4334599785}     Precautions: N/A      Manuals 3/27 4/3 4/6 4/10 4/13 4/17 4/24 5/1     Long axis distraction L hip GENOVEVA  GENOVEVA GENOVEVA GENOVEVA        PSIS rocking GENOVEVA GENOVEVA GENOVEVA GENOVEVA GENOVEVA GENOVEVA GENOVEVA      sidelying SI gapping  GENOVEVA GENOVEVA GENOVEVA         PROM hip IR  GENOVEVA GENOVEVA GENOVEVA         Belted hip IR mobilization    GENOVEVA GENOVEVA GENOVEVA GENOVEVA      Hip add stretching  GENOVEVA GENOVEVA GENOVEVA         Neuro Re-Ed             Pt education 8' 4' 4' 4' 4'  4'      Bridges c hip add  2x10 2x10 2x10 3x10 3x10  3x10 3x10 3\"     TA  1x10 1x10 1x10  1x10 10\"       TA c march  1x10 ea 1x15 ea          Up up down down  1x10 p!   2x10 2x10 2x10  2x10 2x10     90/90 Pullover             Reverse March        1x10 ea     paloff press   1x5 10\" blue 1x5 10\" blue 1x5 10\" blue NV 1x5 10\" blue 1x6 10\" Blue minisquat position                  Ther Ex             Prone hip ext 1x10 3\" 1x10 5\" ea 1x15 5\" ea 2x10 5\" ea 2x10 5\" ea 2x10 5\"  2x10 5\" 2x10 5\" ea alt      Prone press up  1x10 2x10 2x10 2x10 2x10 2x10 2x10     Half kneeling repeated hip extension (hip flex str position)  1x10 2x10 2x10 2x10 2x10  2x10 2x10 ea     Reverse clamshell  2x10 2x10 3x10 2x10 orange 2x10 [place]ach 2x10 orange 2x10 ea OTB     Standing hip abd       2x10 grn 2x12 grn      Sidestepping at bar   2x grn 2x grn 2x grn 2x grn  3x grn 3x grn  No UE     sidelying hip abd    3x10 3x10 2x15 2x15      Seated hip hinge      1x10       Captain morgans     1x10 5\" 1x10 5\" 2x10 5\" 2x10 5\"     Ther Activity             STS from chair       2x10  2x10                   Gait Training                                       Modalities  "

## 2023-05-04 NOTE — PROGRESS NOTES
"Daily Note     Today's date: 2023  Patient name: Luciano Mccullough  : 1960  MRN: [id number]  Referring provider: Lin Bond DO  Dx:   Encounter Diagnosis     ICD-10-CM    1  SI (sacroiliac) joint dysfunction  M53 3                      Subjective: Pt reports that she has been feeling better the last 3 days  Objective: See treatment diary below      Assessment: Tolerated treatment well  Patient exhibited good technique with therapeutic exercises and would benefit from continued PT  When discussing HEP with pt, pt reported that she was performing all of program everyday  Instructed pt on having rest days in order to build muscular strength  Additionally instructed pt to increase frequency of prone press ups with exhale  Pt verbalized and demonstrated understanding  Continue to have IR restrictions consider adding IR based with rep hip ext if pain and motion loss continue to persist      Plan: Continue per plan of care  Progress treatment as tolerated  Precautions: N/A      Manuals 3/27 4/3 4/6 4/10 4/13 4/17 4/24 5/1 5/4    Long axis distraction L hip GENOVEVA  GENOVEVA GENOVEVA GENOVEVA        PSIS rocking GENOVEVA GENOVEVA GENOVEVA GENOVEVA GENOVEVA GENOVEVA GENOVEVA  KB    sidelying SI gapping  GENOVEVA GENOVEVA GENOVEVA         PROM hip IR  GENOVEVA GENOVEVA GENOVEVA         Belted hip IR mobilization    GENOVEVA GENOVEVA GENOVEVA GENOVEVA  KB    Hip add stretching  GENOVEVA GENOVEVA GENOVEVA         Neuro Re-Ed             Pt education 8' 4' 4' 4' 4'  4'      Bridges c hip add  2x10 2x10 2x10 3x10 3x10  3x10 3x10 3\" 3x10 3\"    TA  1x10 1x10 1x10  1x10 10\"       TA c march  1x10 ea 1x15 ea          Up up down down  1x10 p!   2x10 2x10 2x10  2x10 2x10 2x10    90/90 Pullover             Reverse March        1x10 ea 1x10 ea    paloff press   1x5 10\" blue 1x5 10\" blue 1x5 10\" blue NV 1x5 10\" blue 1x6 10\" Blue minisquat position 1x6 10\" Blue minisquat position                 Ther Ex             Prone hip ext 1x10 3\" 1x10 5\" ea 1x15 5\" ea 2x10 5\" ea 2x10 5\" ea 2x10 5\"  2x10 5\" 2x10 5\" ea alt  2x10 5\" ea alt     Prone press up  1x10 " "2x10 2x10 2x10 2x10 2x10 2x10 2x10 (+ exhale)    Half kneeling repeated hip extension (hip flex str position)  1x10 2x10 2x10 2x10 2x10  2x10 2x10 ea 2x10 ea    Reverse clamshell  2x10 2x10 3x10 2x10 orange 2x10 [place] 2x10 orange 2x10 ea OTB 2x10 ea OTB    Standing hip abd       2x10 grn 2x12 grn  2x12 grn     Sidestepping at bar   2x grn 2x grn 2x grn 2x grn  3x grn 3x grn  No UE 3x grn  No UE    sidelying hip abd    3x10 3x10 2x15 2x15      Seated hip hinge      1x10       Captain morgans     1x10 5\" 1x10 5\" 2x10 5\" 2x10 5\" NV    Ther Activity             STS from chair       2x10  2x10  NV                 Gait Training                                       Modalities                                            "

## 2023-05-08 ENCOUNTER — OFFICE VISIT (OUTPATIENT)
Dept: PHYSICAL THERAPY | Facility: CLINIC | Age: 63
End: 2023-05-08

## 2023-05-08 DIAGNOSIS — M53.3 SI (SACROILIAC) JOINT DYSFUNCTION: Primary | ICD-10-CM

## 2023-05-08 NOTE — PROGRESS NOTES
"Daily Note     Today's date: 2023  Patient name: Lluvia Garcia  : 1960  MRN: [id number]  Referring provider: Aparna Oliva DO  Dx:   Encounter Diagnosis     ICD-10-CM    1  SI (sacroiliac) joint dysfunction  M53 3           Start Time: 1100  Stop Time: 1146  Total time in clinic (min): 46 minutes    Subjective: Pt reports she is having slight pain in her low back at the start of therapy today, however, she continues to feel much better  Objective: See treatment diary below      Assessment: Tolerated treatment well  Patient demonstrated fatigue post treatment, exhibited good technique with therapeutic exercises and would benefit from continued PT  Pt was able to progress today by increasing sets and reps for her standing hip abduction and reverse march exercises  Pt required min verbal cues to facilitate performance of proper technique  Assess pt response to treatment at next visit  Plan: Continue per plan of care  Precautions: N/A      Manuals 3/27 4/3 4/ 4/10 4/13 4/17 4/24 5/1 5/4 5/8   Long axis distraction L hip GENOVEVA  GENOVEVA GENOVEVA GENOVEVA        PSIS rocking GENOVEVA GENOVEVA GENOVEVA GENOVEVA GENOVEVA GENOVEVA GENOVEVA  KB GENOVEVA   sidelying SI gapping  GENOVEVA GENOVEVA GENOVEVA         PROM hip IR  GENOVEVA GENOVEVA GENOVEVA         Belted hip IR mobilization    GENOVEVA GENOVEVA GENOVEVA GENOVEVA  KB GENOVEVA   Hip add stretching  GENOVEVA GENOVEVA GENOVEVA         Neuro Re-Ed             Pt education 8' 4' 4' 4' 4'  4'   4'   Bridges c hip add  2x10 2x10 2x10 3x10 3x10  3x10 3x10 3\" 3x10 3\" 3x10 3\"   TA  1x10 1x10 1x10  1x10 10\"       TA c march  1x10 ea 1x15 ea          Up up down down  1x10 p!   2x10 2x10 2x10  2x10 2x10 2x10 2x10   90/90 Pullover             Reverse March        1x10 ea 1x10 ea 2x10 ea   paloff press   1x5 10\" blue 1x5 10\" blue 1x5 10\" blue NV 1x5 10\" blue 1x6 10\" Blue minisquat position 1x6 10\" Blue minisquat position 1x6 10\" Blue minisquat position                Ther Ex             Prone hip ext 1x10 3\" 1x10 5\" ea 1x15 5\" ea 2x10 5\" ea 2x10 5\" ea 2x10 5\"  2x10 5\" 2x10 5\" ea alt  2x10 5\" ea alt  " "2x10 5\" ea alt   Prone press up  1x10 2x10 2x10 2x10 2x10 2x10 2x10 2x10 (+ exhale) 2x10 (+ exhale)   Half kneeling repeated hip extension (hip flex str position)  1x10 2x10 2x10 2x10 2x10  2x10 2x10 ea 2x10 ea 2x10 ea   Reverse clamshell  2x10 2x10 3x10 2x10 orange 2x10 [place] 2x10 orange 2x10 ea OTB 2x10 ea OTB 2x10 ea   Standing hip abd       2x10 grn 2x12 grn  2x12 grn  3x10 grn   Sidestepping at bar   2x grn 2x grn 2x grn 2x grn  3x grn 3x grn  No UE 3x grn  No UE 4x grn no UE   sidelying hip abd    3x10 3x10 2x15 2x15   2x15   Seated hip hinge      1x10       Captain morgans     1x10 5\" 1x10 5\" 2x10 5\" 2x10 5\" NV 2x10 5\"   Ther Activity             STS from chair       2x10  2x10  NV 2x10                Gait Training                                       Modalities                                            "

## 2023-05-10 ENCOUNTER — OFFICE VISIT (OUTPATIENT)
Dept: PHYSICAL THERAPY | Facility: CLINIC | Age: 63
End: 2023-05-10

## 2023-05-10 DIAGNOSIS — M53.3 SI (SACROILIAC) JOINT DYSFUNCTION: Primary | ICD-10-CM

## 2023-05-10 NOTE — PROGRESS NOTES
"Daily Note     Today's date: 5/10/2023  Patient name: Jasmin Lucas  : 1960  MRN: [id number]  Referring provider: Titi Rondon DO  Dx:   Encounter Diagnosis     ICD-10-CM    1  SI (sacroiliac) joint dysfunction  M53 3                      Subjective: Patient reports compliance with HEP, she has been feeling better overall, has a little discomfort in posterior hip  Objective: See treatment diary below      Assessment: Tolerated treatment well  No pain or discomfort reported with outlined program  Added foam to pallof press to progress core stabilization  Patient demonstrated fatigue post treatment and would benefit from continued PT      Plan: Progress treatment as tolerated         Precautions: N/A      Manuals 5/10   4/10 4/13 4/17 4/24 5/1 5/4 5/8   Long axis distraction L hip    GENOVEVA GENOVEVA        PSIS rocking GENOVEVA   GENOVEVA GENOVEVA GENOVEVA GENOVEVA  KB GENOVEVA   sidelying SI gapping    GENOVEVA         PROM hip IR GENOVEVA +SKTC   GENOVEVA         Belted hip IR mobilization    GENOVEVA GENOVEVA GENOVEVA GENOVEVA  KB GENOVEVA   Hip add stretching    GENOVEVA         Neuro Re-Ed             Pt education    4' 4'  4'   4'   Bridges c hip add 3x10 3\"   2x10 3x10 3x10  3x10 3x10 3\" 3x10 3\" 3x10 3\"   TA    1x10  1x10 10\"       TA c march             Up up down down [id number] 3\"   2x10 2x10 2x10  2x10 2x10 2x10 2x10   90/90 Pullover             Reverse March 2x10 ea mod deadbug LE only       1x10 ea 1x10 ea 2x10 ea   paloff press 1x6 10\" blue miniquat position on foam    1x5 10\" blue 1x5 10\" blue NV 1x5 10\" blue 1x6 10\" Blue minisquat position 1x6 10\" Blue minisquat position 1x6 10\" Blue minisquat position                Ther Ex             Prone hip ext 2x10 5\" ea alt   2x10 5\" ea 2x10 5\" ea 2x10 5\"  2x10 5\" 2x10 5\" ea alt  2x10 5\" ea alt  2x10 5\" ea alt   Prone press up 2x10 + exhale    2x10 2x10 2x10 2x10 2x10 2x10 (+ exhale) 2x10 (+ exhale)   Half kneeling repeated hip extension (hip flex str position) 2x10 ea    2x10 2x10 2x10  2x10 2x10 ea 2x10 ea 2x10 ea   Reverse clamshell 2x10 ea " "OTB    3x10 2x10 orange 2x10 [place] 2x10 orange 2x10 ea OTB 2x10 ea OTB 2x10 ea   Standing hip abd 3x10 grn       2x10 grn 2x12 grn  2x12 grn  3x10 grn   Sidestepping at bar 4x grn no UE   2x grn 2x grn 2x grn  3x grn 3x grn  No UE 3x grn  No UE 4x grn no UE   sidelying hip abd 2x15   3x10 3x10 2x15 2x15   2x15   Seated hip hinge      1x10       Captain morgans 2x10 5\"    1x10 5\" 1x10 5\" 2x10 5\" 2x10 5\" NV 2x10 5\"   Ther Activity             STS from chair 2x10       2x10  2x10  NV 2x10                Gait Training                                       Modalities                                            "

## 2023-05-11 ENCOUNTER — APPOINTMENT (OUTPATIENT)
Dept: PHYSICAL THERAPY | Facility: CLINIC | Age: 63
End: 2023-05-11
Payer: COMMERCIAL

## 2023-05-15 ENCOUNTER — OFFICE VISIT (OUTPATIENT)
Dept: OBGYN CLINIC | Facility: CLINIC | Age: 63
End: 2023-05-15

## 2023-05-15 VITALS
HEIGHT: 65 IN | DIASTOLIC BLOOD PRESSURE: 66 MMHG | BODY MASS INDEX: 24.99 KG/M2 | WEIGHT: 150 LBS | HEART RATE: 73 BPM | OXYGEN SATURATION: 96 % | SYSTOLIC BLOOD PRESSURE: 101 MMHG | RESPIRATION RATE: 18 BRPM

## 2023-05-15 DIAGNOSIS — M16.12 PRIMARY OSTEOARTHRITIS OF ONE HIP, LEFT: Primary | ICD-10-CM

## 2023-05-15 DIAGNOSIS — M51.36 DEGENERATIVE DISC DISEASE, LUMBAR: ICD-10-CM

## 2023-05-15 DIAGNOSIS — M53.3 SI (SACROILIAC) JOINT DYSFUNCTION: ICD-10-CM

## 2023-05-15 NOTE — PROGRESS NOTES
"Assessment/Plan:    No problem-specific Assessment & Plan notes found for this encounter  Diagnoses and all orders for this visit:    Primary osteoarthritis of one hip, left    Degenerative disc disease, lumbar    SI (sacroiliac) joint dysfunction        Patient is improved in terms of pain symptoms in regards to left posterior hip pain  Clinical impression is that her primary pain generator was SI joint dysfunction  Advised patient to continue with home exercise program that was taught by physical therapy and she can discontinue formal PT moving forward  We discussed getting a follow-up visit for reevaluation in 3 to 4 months  Advised to return sooner if symptoms recur  Subjective:      Patient ID: Chencho Ta is a 61 y o  female is presenting today for follow-up visit in regards to posterior left hip pain symptoms  Clinical impression is that patient's posterior hip pain symptoms were related to SI joint dysfunction  She was referred to physical therapy and was received SI joint injection  She was subsequently seen as well for her left hip which revealed advanced osteoarthritis and had received an ultrasound-guided hip injection  Overall she states that her pain symptoms have essentially resolved  Occasionally she does have some pain over the SI joint on the left side however she rates this pain a 0 5 out of 10 in severity  Overall she is very pleased with her improvements and her ability to get back to daily activity and exercise    HPI    The following portions of the patient's history were reviewed and updated as appropriate: allergies, current medications, past family history, past medical history, past social history, past surgical history and problem list     Review of Systems      Objective:      /66   Pulse 73   Resp 18   Ht 5' 5\" (1 651 m)   Wt 68 kg (150 lb)   SpO2 96%   BMI 24 96 kg/m²          Physical Exam    Left hip exam elicits limitations with internal rotation " however no pain with this motion  Negative scour  Negative EWA  No tenderness over the SI joint left

## 2023-08-03 ENCOUNTER — OFFICE VISIT (OUTPATIENT)
Dept: OBGYN CLINIC | Facility: CLINIC | Age: 63
End: 2023-08-03
Payer: COMMERCIAL

## 2023-08-03 ENCOUNTER — TELEPHONE (OUTPATIENT)
Dept: OBGYN CLINIC | Facility: CLINIC | Age: 63
End: 2023-08-03

## 2023-08-03 ENCOUNTER — TELEPHONE (OUTPATIENT)
Age: 63
End: 2023-08-03

## 2023-08-03 VITALS
DIASTOLIC BLOOD PRESSURE: 61 MMHG | OXYGEN SATURATION: 98 % | WEIGHT: 150 LBS | BODY MASS INDEX: 24.96 KG/M2 | SYSTOLIC BLOOD PRESSURE: 127 MMHG | HEART RATE: 78 BPM

## 2023-08-03 DIAGNOSIS — M51.36 DEGENERATIVE DISC DISEASE, LUMBAR: ICD-10-CM

## 2023-08-03 DIAGNOSIS — M53.3 SI (SACROILIAC) JOINT DYSFUNCTION: Primary | ICD-10-CM

## 2023-08-03 DIAGNOSIS — M16.12 PRIMARY OSTEOARTHRITIS OF ONE HIP, LEFT: ICD-10-CM

## 2023-08-03 PROCEDURE — 99213 OFFICE O/P EST LOW 20 MIN: CPT | Performed by: FAMILY MEDICINE

## 2023-08-03 NOTE — TELEPHONE ENCOUNTER
Caller: perlita    Doctor: henrietta    Reason for call: wants to schedule another procedure.     Call back#: [phone]

## 2023-08-03 NOTE — PROGRESS NOTES
Assessment/Plan:    No problem-specific Assessment & Plan notes found for this encounter. Diagnoses and all orders for this visit:    SI (sacroiliac) joint dysfunction    Degenerative disc disease, lumbar    Primary osteoarthritis of one hip, left          Patient will follow-up for repeat injection under ultrasound guidance for left hip. Additionally I recommend patient to follow-up with spine pain to discuss SI joint injection repeat. We will follow-up for ultrasound-guided hip injection. Subjective:      Patient ID: Allyson Martinez is a 61 y.o. female is presenting for follow-up visit in regards to left posterior hip pain. Patient was diagnosed with SI joint dysfunction and left hip osteoarthritis which is contributing to posterior hip pain symptoms. She had received cortisone injection for both the left hip under ultrasound guidance and the left SI joint with pain management. States that the injections had helped her tremendously up until 2 weeks ago when symptoms did start to recur. She has been continuing with her home exercise program that was taught by physical therapy.   HPI    The following portions of the patient's history were reviewed and updated as appropriate: allergies, current medications, past family history, past medical history, past social history, past surgical history and problem list.    Review of Systems      Objective:      /61   Pulse 78   Wt 68 kg (150 lb)   SpO2 98%   BMI 24.96 kg/m²          Physical Exam    Left hip exam elicits limitations with internal rotation however no pain with this motion  Positive FADIR  Lumbar spine is fully functional with flexion extension right and left rotation with no referred pain  No spinous process or paraspinal muscle tenderness  Negative scour  Positive EWA  Positive tenderness over the SI joint left

## 2023-08-03 NOTE — TELEPHONE ENCOUNTER
LVM for patient letting her know she does not need another Referral to pain management . The referral from April is still good per orders Dr Aditi Becker was informed if she did however need one to please give us a call and Dr Uriel Hunt will put it in .

## 2023-08-04 NOTE — TELEPHONE ENCOUNTER
Pt was direct referral for SI joint injection 4/20. Pt requesting repeat. Saw ortho yesterday who advised her to f/u with pain management. Ok to schedule repeat?

## 2023-08-07 ENCOUNTER — OFFICE VISIT (OUTPATIENT)
Dept: OBGYN CLINIC | Facility: CLINIC | Age: 63
End: 2023-08-07
Payer: COMMERCIAL

## 2023-08-07 VITALS
WEIGHT: 148 LBS | SYSTOLIC BLOOD PRESSURE: 103 MMHG | RESPIRATION RATE: 18 BRPM | BODY MASS INDEX: 24.66 KG/M2 | HEIGHT: 65 IN | OXYGEN SATURATION: 98 % | DIASTOLIC BLOOD PRESSURE: 70 MMHG | HEART RATE: 89 BPM

## 2023-08-07 DIAGNOSIS — M53.3 DISORDER OF SACRUM: Primary | ICD-10-CM

## 2023-08-07 DIAGNOSIS — M16.12 PRIMARY OSTEOARTHRITIS OF ONE HIP, LEFT: Primary | ICD-10-CM

## 2023-08-07 PROCEDURE — 20611 DRAIN/INJ JOINT/BURSA W/US: CPT | Performed by: FAMILY MEDICINE

## 2023-08-07 RX ADMIN — BUPIVACAINE HYDROCHLORIDE 8 ML: 2.5 INJECTION, SOLUTION INFILTRATION; PERINEURAL at 15:30

## 2023-08-07 RX ADMIN — TRIAMCINOLONE ACETONIDE 40 MG: 40 INJECTION, SUSPENSION INTRA-ARTICULAR; INTRAMUSCULAR at 15:30

## 2023-08-07 NOTE — PROGRESS NOTES
Large joint arthrocentesis: L hip joint  Universal Protocol:  Consent: Verbal consent obtained. Risks and benefits: risks, benefits and alternatives were discussed  Consent given by: patient  Patient identity confirmed: verbally with patient    Supporting Documentation  Indications: joint swelling and pain   Procedure Details  Location: hip - L hip joint  Preparation: Patient was prepped and draped in the usual sterile fashion  Needle size: 22 G  Ultrasound guidance: yes  Approach: anterolateral  Medications administered: 8 mL bupivacaine 0.25 %; 40 mg triamcinolone acetonide 40 mg/mL    Patient tolerance: patient tolerated the procedure well with no immediate complications    Risks and benefits of CSI were discussed with patient extensively. Risks were highlighted which included but were not limited to infection, pain, local site swelling, and chance that injection may not be effective. Patient was also counseled regarding glucose elevation days after receiving CSI and to be mindful of diet and check sugars daily. Patient agreeable to proceed with CSI after counseling.      US images saved to [employer]

## 2023-08-09 RX ORDER — BUPIVACAINE HYDROCHLORIDE 2.5 MG/ML
8 INJECTION, SOLUTION INFILTRATION; PERINEURAL
Status: COMPLETED | OUTPATIENT
Start: 2023-08-07 | End: 2023-08-07

## 2023-08-09 RX ORDER — TRIAMCINOLONE ACETONIDE 40 MG/ML
40 INJECTION, SUSPENSION INTRA-ARTICULAR; INTRAMUSCULAR
Status: COMPLETED | OUTPATIENT
Start: 2023-08-07 | End: 2023-08-07

## 2023-08-22 ENCOUNTER — HOSPITAL ENCOUNTER (OUTPATIENT)
Dept: RADIOLOGY | Facility: CLINIC | Age: 63
Discharge: HOME | End: 2023-08-22
Payer: COMMERCIAL

## 2023-08-22 VITALS
DIASTOLIC BLOOD PRESSURE: 77 MMHG | TEMPERATURE: 96 F | RESPIRATION RATE: 18 BRPM | OXYGEN SATURATION: 93 % | HEART RATE: 67 BPM | SYSTOLIC BLOOD PRESSURE: 115 MMHG

## 2023-08-22 DIAGNOSIS — M53.3 DISORDER OF SACRUM: ICD-10-CM

## 2023-08-22 PROCEDURE — 27096 INJECT SACROILIAC JOINT: CPT | Performed by: STUDENT IN AN ORGANIZED HEALTH CARE EDUCATION/TRAINING PROGRAM

## 2023-08-22 RX ORDER — METHYLPREDNISOLONE ACETATE 40 MG/ML
40 INJECTION, SUSPENSION INTRA-ARTICULAR; INTRALESIONAL; INTRAMUSCULAR; PARENTERAL; SOFT TISSUE ONCE
Status: COMPLETED | OUTPATIENT
Start: 2023-08-22 | End: 2023-08-22

## 2023-08-22 RX ORDER — BUPIVACAINE HCL/PF 2.5 MG/ML
1 VIAL (ML) INJECTION ONCE
Status: COMPLETED | OUTPATIENT
Start: 2023-08-22 | End: 2023-08-22

## 2023-08-22 RX ADMIN — IOHEXOL 0.5 ML: 300 INJECTION, SOLUTION INTRAVENOUS at 11:21

## 2023-08-22 RX ADMIN — Medication 1 ML: at 11:22

## 2023-08-22 RX ADMIN — METHYLPREDNISOLONE ACETATE 40 MG: 40 INJECTION, SUSPENSION INTRA-ARTICULAR; INTRALESIONAL; INTRAMUSCULAR; PARENTERAL; SOFT TISSUE at 11:22

## 2023-08-22 NOTE — H&P
History of Present Illness: The patient is a 61 y.o. female who presents with complaints of left low back pain    Past Medical History:   Diagnosis Date   • No known health problems        Past Surgical History:   Procedure Laterality Date   • JOINT REPLACEMENT  02/09/2011   • TONSILLECTOMY     • TOTAL HIP ARTHROPLASTY Right 02/09/2011   • TUBAL LIGATION           Current Outpatient Medications:   •  Calcium-Magnesium-Vitamin D (CALCIUM MAGNESIUM PO), Take by mouth, Disp: , Rfl:   •  Cholecalciferol (VITAMIN D3 PO), Take by mouth, Disp: , Rfl:   •  Multiple Vitamins-Minerals (MENS 50+ MULTI VITAMIN/MIN PO), Take by mouth, Disp: , Rfl:   •  Turmeric 500 MG CAPS, Take by mouth, Disp: , Rfl:     Current Facility-Administered Medications:   •  bupivacaine (PF) (MARCAINE) 0.25 % injection 1 mL, 1 mL, Intra-articular, Once, Ana Fairbanks MD  •  iohexol (OMNIPAQUE) 300 mg/mL injection 0.5 mL, 0.5 mL, Intra-articular, Once, Ana Fairbanks MD  •  methylPREDNISolone acetate (DEPO-MEDROL) injection 40 mg, 40 mg, Intra-articular, Once, Ana Fairbanks MD    Allergies   Allergen Reactions   • Penicillins      Other reaction(s): Unknown Reaction  hives         Physical Exam:   Vitals:    08/22/23 1109   BP: 112/75   Pulse: 65   Resp: 20   Temp: (!) 96 °F (35.6 °C)   SpO2: 95%     General: Awake, Alert, Oriented x 3, Mood and affect appropriate  Respiratory: Respirations even and unlabored  Cardiovascular: Peripheral pulses intact; no edema  Musculoskeletal Exam: TTP left SI joint    ASA Score: 3    Patient/Chart Verification  Patient ID Verified: Verbal  ID Band Applied: No  Consents Confirmed: To be obtained in the Pre-Procedure area  H&P( within 30 days) Verified: To be obtained in the Pre-Procedure area  Interval H&P(within 24 hr) Complete (required for Outpatients and Surgery Admit only): To be obtained in the Pre-Procedure area  Allergies Reviewed:  Yes  Anticoag/NSAID held?: NA  Currently on antibiotics?: No  Pregnancy denied?: NA    Assessment:   1.  Disorder of sacrum        Plan: SI JOINT INJECTION

## 2023-08-22 NOTE — DISCHARGE INSTR - LAB

## 2024-03-26 ENCOUNTER — TELEPHONE (OUTPATIENT)
Age: 64
End: 2024-03-26

## 2024-03-26 NOTE — TELEPHONE ENCOUNTER
S/W pt regarding below request  Pt had Left sacroiliac joint injection on 8/22    Pt states pain returned in January in the same spot.  Left extreme LB to the left of tailbone.  Pt states pain is 4-5/10 and waxes and wanes.   Is coming back to area in May and would like to repeat injection.  States she is seeing Dr Francis on 5/14    Please advise

## 2024-03-26 NOTE — TELEPHONE ENCOUNTER
Caller: Brooklyn    Doctor: henrietta    Reason for call: pt would like to schedule a repeat injection that she had 8/22/2023.  [place] of [place] and will be back for a few weeks in May.    Pt would be available 5/13-5/27, pt can not do 5/14    Call back#: [phone]

## 2024-03-26 NOTE — TELEPHONE ENCOUNTER
Patient was a direct injection and never seen for consultation. She will either need consult first or have Dr. Francis order the injection if he feels it is appropriate.

## 2024-03-26 NOTE — TELEPHONE ENCOUNTER
S/W pt and advised the same  She will call after seeing Dr Francis and let us know if the injection was ordered  CB with questions or concerns

## 2024-05-14 ENCOUNTER — TELEPHONE (OUTPATIENT)
Age: 64
End: 2024-05-14

## 2024-05-14 ENCOUNTER — OFFICE VISIT (OUTPATIENT)
Dept: OBGYN CLINIC | Facility: CLINIC | Age: 64
End: 2024-05-14
Payer: COMMERCIAL

## 2024-05-14 VITALS
RESPIRATION RATE: 18 BRPM | BODY MASS INDEX: 23.49 KG/M2 | DIASTOLIC BLOOD PRESSURE: 76 MMHG | HEIGHT: 65 IN | OXYGEN SATURATION: 97 % | TEMPERATURE: 97.8 F | SYSTOLIC BLOOD PRESSURE: 115 MMHG | HEART RATE: 89 BPM | WEIGHT: 141 LBS

## 2024-05-14 DIAGNOSIS — M53.3 SI (SACROILIAC) JOINT DYSFUNCTION: Primary | ICD-10-CM

## 2024-05-14 DIAGNOSIS — M16.12 PRIMARY OSTEOARTHRITIS OF ONE HIP, LEFT: ICD-10-CM

## 2024-05-14 PROCEDURE — 99213 OFFICE O/P EST LOW 20 MIN: CPT | Performed by: FAMILY MEDICINE

## 2024-05-14 NOTE — TELEPHONE ENCOUNTER
Caller: Cristofer     Doctor: Tito     Reason for call: Would like to know if you can place an order for patient to have a hip injection with Dr. Hobbs so they can get patient in sooner bypassing the consultation, since Dr. Delgado scheduling way out.     Call back#: [phone]

## 2024-05-14 NOTE — PROGRESS NOTES
"    Chief Complaint   Patient presents with    Left Hip - Pain, Follow-up       Brooklyn Burr is a 64 y.o. female is presenting today for follow-up evaluation for left hip pain.  The patient reports that she had received relief with both intra-articular hip and SI joint injection which was performed approximately 9 months ago.  She states few months ago the pain has recurred and feels that it is a tightness in the left leg and lower back.  She localizes the pain to the left lower back and does report radiation down the posterior aspect of the leg into the thigh.  She denies any numbness or tingling sensation and denies any bowel or bladder incontinence.    The following portions of the patient's history were reviewed and updated as appropriate: allergies, current medications, past family history, past medical history, past social history, past surgical history and problem list.    Occupation:    Review of Systems   Constitutional: Negative for fever.   HENT: Negative for dental problem and headaches.    Eyes: Negative for vision loss.   Respiratory: Negative for cough and shortness of breath.    Cardiovascular: Negative for leg swelling and palpitations.   Gastrointestinal: Negative for constipation and diarrhea.   Genitourinary: Negative for bladder incontinence and difficulty urinating.   Musculoskeletal: Negative for back pain and difficulty walking.   Skin: Negative for rash and ulcer.   Neurological: Negative for dizziness and headaches.   Hem/Lymph/Immuno: Negative for blood clots. Does not bruise/bleed easily.   Psychiatric/Behavioral: Negative for confusion.         Objective:  /76   Pulse 89   Temp 97.8 °F (36.6 °C)   Resp 18   Ht 5' 5\" (1.651 m)   Wt 64 kg (141 lb)   SpO2 97%   BMI 23.46 kg/m²     Skin: no rashes, lesions, skin discolorations, lacerations  Vasculature: normal popliteal and pedal pulse, normal skin color, normal capillary refill in extremity, no lower extremity " edema  Neurologic: Neurologic exam is normal throughout lower extremities, Awake, alert, and oriented x3, no apparent distress.    Neuro: no weakness in the L4-S1 nerve distribution  Musculoskeletal:  Inspection: no observable abnormalities  Palpation no tenderness to palpation over greater trochanter  ROM: There is limited motion with internal rotation of the hip  Special tests: negative FADIR   And EWA test does elicit pain over the left SI joint  There is tenderness to palpation over the left SI PSIS  Negative straight leg raise  No sensory deficits in the lower extremity            Assessment/Plan:  1. SI (sacroiliac) joint dysfunction    - Ambulatory referral to Spine & Pain Management; Future  - Ambulatory referral to Spine & Pain Management; Future    2. Primary osteoarthritis of one hip, left    Patient's primary symptoms and examination seem to be consistent with left SI joint pain and possible referred pain from discogenic lumbar etiology.  FADIR testing does not elicit pain in the hip although she does have severe limitation with internal rotation of the left hip joint.  My clinical impression is that her primary pain generator seems to be arising from lumbar SI joint etiology.  We discussed consideration for left hip ultrasound injection however I would like to her to first consider treatment for SI joint lumbar spine with possible interventional injection.  I had discussion with patient that if symptoms fail to improve with interventional SI lumbar injection we can schedule her for ultrasound-guided hip as she may be experiencing atypical hip pain presentation.

## 2024-05-15 DIAGNOSIS — M53.3 SI (SACROILIAC) JOINT DYSFUNCTION: Primary | ICD-10-CM

## 2024-05-15 NOTE — TELEPHONE ENCOUNTER
Caller: Brooklyn     Doctor: Faby Watson    Reason for call: Checking the status of scheduling inj    Call back#: [phone]

## 2024-05-15 NOTE — TELEPHONE ENCOUNTER
Lm for pt -- advised opening is available for 5/21 2:10pm (pt needed appt prior to leaving area on 5/28).  Asked for rcb to advise on whether she would like this appt.

## 2024-05-15 NOTE — TELEPHONE ENCOUNTER
Caller: Brooklyn     Doctor: henrietta     Reason for call: xfer pt to nurse to assist    Call back#: [phone]

## 2024-05-15 NOTE — TELEPHONE ENCOUNTER
"Can order please be re-entered as \"FL spine and pain procedure\" -- \"ambulatory referral to spine and pain,\" won't allow me to place pt on the procedure schedule, as Dr Hobbs has ok'd.    Thanks!    "

## 2024-05-16 ENCOUNTER — PATIENT MESSAGE (OUTPATIENT)
Dept: RADIOLOGY | Facility: CLINIC | Age: 64
End: 2024-05-16

## 2024-05-16 ENCOUNTER — ANNUAL EXAM (OUTPATIENT)
Age: 64
End: 2024-05-16
Payer: COMMERCIAL

## 2024-05-16 VITALS
BODY MASS INDEX: 23.66 KG/M2 | HEART RATE: 82 BPM | DIASTOLIC BLOOD PRESSURE: 86 MMHG | SYSTOLIC BLOOD PRESSURE: 120 MMHG | WEIGHT: 142 LBS | HEIGHT: 65 IN

## 2024-05-16 DIAGNOSIS — Z01.419 ENCOUNTER FOR GYNECOLOGICAL EXAMINATION WITHOUT ABNORMAL FINDING: Primary | ICD-10-CM

## 2024-05-16 DIAGNOSIS — Z12.31 SCREENING MAMMOGRAM FOR BREAST CANCER: ICD-10-CM

## 2024-05-16 DIAGNOSIS — Z78.0 ASYMPTOMATIC POSTMENOPAUSAL STATUS: ICD-10-CM

## 2024-05-16 PROCEDURE — 99396 PREV VISIT EST AGE 40-64: CPT | Performed by: NURSE PRACTITIONER

## 2024-05-16 PROCEDURE — G0145 SCR C/V CYTO,THINLAYER,RESCR: HCPCS | Performed by: NURSE PRACTITIONER

## 2024-05-16 PROCEDURE — G0476 HPV COMBO ASSAY CA SCREEN: HCPCS | Performed by: NURSE PRACTITIONER

## 2024-05-16 NOTE — PROGRESS NOTES
Diagnoses and all orders for this visit:    Diagnoses and all orders for this visit:    Encounter for gynecological examination without abnormal finding    Asymptomatic postmenopausal status  -     DXA bone density spine hip and pelvis; Future    Screening mammogram for breast cancer  -     Mammo screening bilateral w 3d & cad; Future      Call as needed, encouraged calcium/vit D in her diet with weightbearing exercise, call with any PMB, all questions answered          Pleasant 64 y.o. postmenopausal female here for annual exam. She denies postmenopausal bleeding. Denies history of abnormal pap smears, last Pap 2019 NEG AND HPV NEG, a pap with cotest was done today. Denies vaginal issues. Denies pelvic pain. Denies postmenopausal issues. Not sexually active in a few years. Colonoscopy done 07/18/2022, due again 3 yrs. Occasional smoker. Mammogram 05/14/2024 BR1. DXA ordered today.  Encourage smoking cessation.    Past Medical History:   Diagnosis Date    No known health problems      Past Surgical History:   Procedure Laterality Date    JOINT REPLACEMENT  02/09/2011    TONSILLECTOMY      TOTAL HIP ARTHROPLASTY Right 02/09/2011    TUBAL LIGATION       Family History   Problem Relation Age of Onset    Diabetes Mother     Heart disease Mother     Cancer Father     Breast cancer Neg Hx     Colon cancer Neg Hx     Ovarian cancer Neg Hx     Uterine cancer Neg Hx     Cervical cancer Neg Hx      Social History     Tobacco Use    Smoking status: Some Days     Current packs/day: 0.25     Average packs/day: 0.3 packs/day for 35.0 years (8.8 ttl pk-yrs)     Types: Cigarettes    Smokeless tobacco: Never   Vaping Use    Vaping status: Never Used   Substance Use Topics    Alcohol use: Never    Drug use: Never       Current Outpatient Medications:     KRILL OIL PO, Take 1 tablet by mouth, Disp: , Rfl:     Multiple Vitamins-Minerals (MULTIVITAMIN ADULTS 50+ PO), Take by mouth, Disp: , Rfl:     Red Yeast Rice Extract (RED YEAST  "RICE PO), Take 1 tablet by mouth in the morning, Disp: , Rfl:     Turmeric 500 MG CAPS, Take by mouth, Disp: , Rfl:     Calcium-Magnesium-Vitamin D (CALCIUM MAGNESIUM PO), Take by mouth (Patient not taking: Reported on 2024), Disp: , Rfl:     Cholecalciferol (VITAMIN D3 PO), Take by mouth (Patient not taking: Reported on 2024), Disp: , Rfl:   Patient Active Problem List    Diagnosis Date Noted    Regular astigmatism, left eye 2021    Asymptomatic postmenopausal status 2019    Combined forms of age-related cataract 2019       Allergies   Allergen Reactions    Penicillins      Other reaction(s): Unknown Reaction  hives         OB History    Para Term  AB Living   3 3 3     3   SAB IAB Ectopic Multiple Live Births           3      # Outcome Date GA Lbr Henok/2nd Weight Sex Type Anes PTL Lv   3 Term            2 Term            1 Term              2 sons and daughter, 1 granddaughter 6mos old!  1 son lives in [place] and she will be visiting him for the next 4 months.  Retired      Vitals:    24 1037   BP: 120/86   BP Location: Left arm   Patient Position: Sitting   Cuff Size: Standard   Pulse: 82   Weight: 64.4 kg (142 lb)   Height: 5' 5\" (1.651 m)       Body mass index is 23.63 kg/m².    Review of Systems   Constitutional: Negative for chills, fatigue, fever and unexpected weight change.   Respiratory: Negative for shortness of breath.    Gastrointestinal: Negative for anal bleeding, blood in stool, constipation and diarrhea.   Genitourinary: Negative for difficulty urinating, dysuria and hematuria.     Physical Exam   Constitutional: She appears well-developed and well-nourished. No distress.   HENT: atraumatic, EOMI  Head: Normocephalic.   Neck: Normal range of motion. Neck supple.   Pulmonary: Effort normal.  Breasts: bilateral without masses, skin changes or nipple discharge. Bilaterally soft and warm to touch. No areas of erythema or pain.  Abdominal: Soft.   Pelvic " exam was performed with patient supine. No labial fusion. There is no rash, tenderness, lesion or injury on the right labia. There is no rash, tenderness, lesion or injury on the left labia. MULTIPLE LOWER INTROITAL AND PERINEAL SMALL EPIDERMAL CYSTS NOTED FOR WHICH SHE IS ALSO ASYMPTOMATIC  Uterus is not deviated, not enlarged, not fixed and not tender. Cervix exhibits no motion tenderness, no discharge and no friability. Right adnexum displays no mass, no tenderness and no fullness. Left adnexum displays no mass, no tenderness and no fullness. No erythema or tenderness in the vagina. No foreign body in the vagina. No signs of injury around the vagina. No vaginal discharge found.   Lymphadenopathy:        Right: No inguinal adenopathy present.        Left: No inguinal adenopathy present.

## 2024-05-16 NOTE — PATIENT COMMUNICATION
Pt scheduled for SI Joint Injection with Dr Hobbs on 5/21/24, as ordered by Dr Francis    No medication holds needed for joint injection.  Pt is not diabetic.    Pt given instructions over phone and via myc message    Have you completed PT/HEP/Chiro in the past 6 months for dedicated area? Not within 6 mos -- see below  If yes, how long did you complete?  What was the frequency?  Did it provide relief?  If no, reason therapy was not completed?   *PT completed with [facility] from March 2023 thru May 2023, HEP completed after that time -- pt still had/has pain post PT/HEP and has had other, successful injection since that time

## 2024-05-21 ENCOUNTER — HOSPITAL ENCOUNTER (OUTPATIENT)
Dept: RADIOLOGY | Facility: CLINIC | Age: 64
Discharge: HOME | End: 2024-05-21
Admitting: STUDENT IN AN ORGANIZED HEALTH CARE EDUCATION/TRAINING PROGRAM
Payer: COMMERCIAL

## 2024-05-21 ENCOUNTER — TELEPHONE (OUTPATIENT)
Dept: RADIOLOGY | Facility: CLINIC | Age: 64
End: 2024-05-21

## 2024-05-21 VITALS
SYSTOLIC BLOOD PRESSURE: 119 MMHG | OXYGEN SATURATION: 93 % | DIASTOLIC BLOOD PRESSURE: 64 MMHG | HEART RATE: 79 BPM | RESPIRATION RATE: 20 BRPM

## 2024-05-21 DIAGNOSIS — M53.3 SI (SACROILIAC) JOINT DYSFUNCTION: ICD-10-CM

## 2024-05-21 PROCEDURE — 27096 INJECT SACROILIAC JOINT: CPT | Performed by: STUDENT IN AN ORGANIZED HEALTH CARE EDUCATION/TRAINING PROGRAM

## 2024-05-21 RX ORDER — ROPIVACAINE HYDROCHLORIDE 2 MG/ML
1 INJECTION, SOLUTION EPIDURAL; INFILTRATION; PERINEURAL ONCE
Status: COMPLETED | OUTPATIENT
Start: 2024-05-21 | End: 2024-05-21

## 2024-05-21 RX ORDER — METHYLPREDNISOLONE ACETATE 40 MG/ML
40 INJECTION, SUSPENSION INTRA-ARTICULAR; INTRALESIONAL; INTRAMUSCULAR; PARENTERAL; SOFT TISSUE ONCE
Status: COMPLETED | OUTPATIENT
Start: 2024-05-21 | End: 2024-05-21

## 2024-05-21 RX ADMIN — IOHEXOL 0.5 ML: 300 INJECTION, SOLUTION INTRAVENOUS at 13:55

## 2024-05-21 RX ADMIN — ROPIVACAINE HYDROCHLORIDE 1 ML: 2 INJECTION, SOLUTION EPIDURAL; INFILTRATION; PERINEURAL at 13:55

## 2024-05-21 RX ADMIN — METHYLPREDNISOLONE ACETATE 40 MG: 40 INJECTION, SUSPENSION INTRA-ARTICULAR; INTRALESIONAL; INTRAMUSCULAR; PARENTERAL; SOFT TISSUE at 13:55

## 2024-05-21 NOTE — H&P
History of Present Illness: The patient is a 64 y.o. female who presents with complaints of left low back pain    Past Medical History:   Diagnosis Date    No known health problems        Past Surgical History:   Procedure Laterality Date    JOINT REPLACEMENT  02/09/2011    TONSILLECTOMY      TOTAL HIP ARTHROPLASTY Right 02/09/2011    TUBAL LIGATION           Current Outpatient Medications:     Calcium-Magnesium-Vitamin D (CALCIUM MAGNESIUM PO), Take by mouth (Patient not taking: Reported on 5/16/2024), Disp: , Rfl:     Cholecalciferol (VITAMIN D3 PO), Take by mouth (Patient not taking: Reported on 5/16/2024), Disp: , Rfl:     KRILL OIL PO, Take 1 tablet by mouth, Disp: , Rfl:     Multiple Vitamins-Minerals (MULTIVITAMIN ADULTS 50+ PO), Take by mouth, Disp: , Rfl:     Red Yeast Rice Extract (RED YEAST RICE PO), Take 1 tablet by mouth in the morning, Disp: , Rfl:     Turmeric 500 MG CAPS, Take by mouth, Disp: , Rfl:     Allergies   Allergen Reactions    Penicillins      Other reaction(s): Unknown Reaction  hives         Physical Exam: There were no vitals filed for this visit.  General: Awake, Alert, Oriented x 3, Mood and affect appropriate  Respiratory: Respirations even and unlabored  Cardiovascular: Peripheral pulses intact; no edema  Musculoskeletal Exam: ttp left SI joint    ASA Score: 3         Assessment:   1. SI (sacroiliac) joint dysfunction        Plan: Left SI Joint Injection

## 2024-05-21 NOTE — TELEPHONE ENCOUNTER
Lm for pt -- asked if she would like to come for injection at 1pm today, due to cancellation, instead of 2:10.  Asked for rcb.    If she can not come at 1, procedure will be left at 2:10 as scheduled.

## 2024-05-21 NOTE — TELEPHONE ENCOUNTER
Caller: Patient     Doctor: Faby     Reason for call: Patient unable to come in for 1. Will be there for originally scheduled apt at 2:10     Call back#: [phone]

## 2024-05-21 NOTE — DISCHARGE INSTR - LAB

## 2024-05-23 LAB
LAB AP GYN PRIMARY INTERPRETATION: NORMAL
Lab: NORMAL

## 2024-08-13 ENCOUNTER — TELEPHONE (OUTPATIENT)
Age: 64
End: 2024-08-13

## 2024-08-13 NOTE — TELEPHONE ENCOUNTER
Caller: Brooklyn     Doctor: Faby     Reason for call: Please mail new patient paperwork to address on file. Thank you     Call back#: [phone]

## 2024-09-10 ENCOUNTER — TELEPHONE (OUTPATIENT)
Dept: PAIN MEDICINE | Facility: CLINIC | Age: 64
End: 2024-09-10

## 2024-09-12 ENCOUNTER — HOSPITAL ENCOUNTER (OUTPATIENT)
Age: 64
Discharge: HOME | End: 2024-09-12
Payer: COMMERCIAL

## 2024-09-12 ENCOUNTER — OFFICE VISIT (OUTPATIENT)
Dept: OBGYN CLINIC | Facility: CLINIC | Age: 64
End: 2024-09-12
Payer: COMMERCIAL

## 2024-09-12 ENCOUNTER — APPOINTMENT (OUTPATIENT)
Dept: RADIOLOGY | Facility: CLINIC | Age: 64
End: 2024-09-12
Payer: COMMERCIAL

## 2024-09-12 VITALS
BODY MASS INDEX: 23.69 KG/M2 | HEIGHT: 65 IN | SYSTOLIC BLOOD PRESSURE: 113 MMHG | WEIGHT: 142.2 LBS | DIASTOLIC BLOOD PRESSURE: 76 MMHG | HEART RATE: 84 BPM | RESPIRATION RATE: 18 BRPM | TEMPERATURE: 97.9 F | OXYGEN SATURATION: 96 %

## 2024-09-12 DIAGNOSIS — M53.3 SI (SACROILIAC) JOINT DYSFUNCTION: ICD-10-CM

## 2024-09-12 DIAGNOSIS — Z78.0 ASYMPTOMATIC POSTMENOPAUSAL STATUS: ICD-10-CM

## 2024-09-12 DIAGNOSIS — M16.12 PRIMARY OSTEOARTHRITIS OF ONE HIP, LEFT: ICD-10-CM

## 2024-09-12 DIAGNOSIS — M53.3 SI (SACROILIAC) JOINT DYSFUNCTION: Primary | ICD-10-CM

## 2024-09-12 PROCEDURE — 72100 X-RAY EXAM L-S SPINE 2/3 VWS: CPT

## 2024-09-12 PROCEDURE — 99214 OFFICE O/P EST MOD 30 MIN: CPT | Performed by: FAMILY MEDICINE

## 2024-09-12 PROCEDURE — 77080 DXA BONE DENSITY AXIAL: CPT

## 2024-09-12 RX ORDER — PERPHENAZINE 4 MG
TABLET ORAL
COMMUNITY

## 2024-09-12 RX ORDER — MAGNESIUM 30 MG
30 TABLET ORAL 2 TIMES DAILY
COMMUNITY

## 2024-09-12 NOTE — PROGRESS NOTES
"    Chief Complaint   Patient presents with    Left Hip - Follow-up, Pain     Different and constant pain ,Lower back and quads       Brooklyn Burr is a 64 y.o. female presenting for a 4-month follow-up visit for left hip and lower back pain.  Patient was referred for SI joint injection for left lower back which she reports improved pain symptoms.  She states that the injection therapy only provided about 1 month of relief.  She was active with walking about 2 miles a day and states that the pain symptoms had recurred.  She isolates the pain once again to the left SI joint and states that the pain does radiate into the left gluteal region.  Unfortunately she is very claustrophobic and states that she has been unable to tolerate MRI study in the past.  She denies any groin or hip pain.      The following portions of the patient's history were reviewed and updated as appropriate: allergies, current medications, past family history, past medical history, past social history, past surgical history and problem list.    Occupation:    Review of Systems   Constitutional: Negative for fever.   HENT: Negative for dental problem and headaches.    Eyes: Negative for vision loss.   Respiratory: Negative for cough and shortness of breath.    Cardiovascular: Negative for leg swelling and palpitations.   Gastrointestinal: Negative for constipation and diarrhea.   Genitourinary: Negative for bladder incontinence and difficulty urinating.   Musculoskeletal: Negative for back pain and difficulty walking.   Skin: Negative for rash and ulcer.   Neurological: Negative for dizziness and headaches.   Hem/Lymph/Immuno: Negative for blood clots. Does not bruise/bleed easily.   Psychiatric/Behavioral: Negative for confusion.         Objective:  /76   Pulse 84   Temp 97.9 °F (36.6 °C)   Resp 18   Ht 5' 5\" (1.651 m)   Wt 64.5 kg (142 lb 3.2 oz)   SpO2 96%   BMI 23.66 kg/m²     Skin: no rashes, lesions, skin discolorations, " lacerations  Vasculature: normal popliteal and pedal pulse, normal skin color, normal capillary refill in extremity, no lower extremity edema  Neurologic: Neurologic exam is normal throughout lower extremities, Awake, alert, and oriented x3, no apparent distress.    Neuro: no weakness in the L4-S1 nerve distribution  Musculoskeletal:  Inspection: no observable abnormalities  Palpation no tenderness to palpation over greater trochanter  ROM: There is limited motion with internal rotation of the hip  Special tests: negative FADIR       EWA test does elicit pain over the left SI joint  There is tenderness to palpation over the left SI PSIS  Negative straight leg raise  No sensory deficits in the lower extremity            Assessment/Plan:  1. SI (sacroiliac) joint dysfunction    2. Primary osteoarthritis of one hip, left    64-year-old female patient presenting today for a follow-up visit for left lower back and hip pain.  Patient has suspected SI joint dysfunction and severe left hip osteoarthritis.  In the past she has had positive response to intra-articular hip injection and SI joint injection which provided her relief.  Her examination today does not reveal much pain reproduction with hip evaluation and it seems that her pain symptoms are isolated more to the lower back left SI joint.  I discussed with the patient that I would recommend an MRI of the lumbar spine however she declines as she is very claustrophobic and feels that she cannot tolerate the MRI study.  Did discuss the option to do this under anesthesia or with benzodiazepine medication however patient declines.  Updated x-ray of lumbar spine was obtained for review and there was no changes seen from the prior radiograph dated 3/18/2023.  She does have an upcoming appointment with spine and pain for consultation and I encouraged her to keep this follow-up visit for further recommendations.  I did discuss with the patient the option to repeat the  intra-articular hip injection for hip osteoarthritis however I discussed that this may not help alleviate the pain symptoms that she is experiencing.  She will be scheduled for IA hip injection for left hip OA as both diagnositc and therapeutic intervention  >45 spent with patient and reviewing prior records and managemnet

## 2024-09-16 NOTE — PROGRESS NOTES
Assessment:  1. Primary osteoarthritis of left hip    2. Sacroiliitis (HCC)    3. Chronic left-sided low back pain without sciatica        Plan:  No orders of the defined types were placed in this encounter.      No orders of the defined types were placed in this encounter.      My impressions and treatment recommendations were discussed in detail with the patient, who verbalized understanding and had no further questions.    64-year-old female returns her office with complaints of left-sided lower back pain into the gluteal region and occasionally into the left anterior thigh.  She has had 3 left SI joint injections over the last year and a half and last injection was not helpful for more than a month.  Upon examination today, does not have significant tenderness over the left SI joint or over the left lower lumbar facet joints.  Also notable is no increased pain with extension or facet loading towards the left.  However, exam was notable for pain in the left anterior thigh with external rotation of the left hip as well as Stinchfield maneuver.  Some of her symptoms are certainly from her left hip.  She did have left intra-articular hip injection about 1 year ago and she will be getting repeat injection next week.  Advised her to see how she does with this injection.  If she is still having notable pain in the back after 2 weeks, then we will order a CT of the lumbar spine as she is notably claustrophobic and cannot tolerate MRI.      [place] Prescription Drug Monitoring Program report was reviewed and was appropriate     Complete risks and benefits including bleeding, infection, tissue reaction, nerve injury and allergic reaction were discussed. The approach was demonstrated using models and literature was provided. Verbal and written consent was obtained.    Discharge instructions were provided. I personally saw and examined the patient and I agree with the above discussed plan of care.    History of Present  Illness:    Brooklyn Burr is a 64 y.o. female who presents to [facility] for initial evaluation of the above stated pain complaints. The patient has a past medical and chronic pain history as outlined in the assessment section. She was referred by Referral Self  No address on file .    Last seen by me on 5/21/2024 for left SI joint injection with significant relief for about 1 month.  Returns today with left-sided lower back pain going into the left gluteal region. She reports previous SI injections lasted longer.     Patient saw Dr. Francis  who recommended MRI of the lumbar spine, however she is claustrophobic.  Diagnostic and potentially therapeutic left hip injection was discussed.    Pain is 8 out of 10.  Moderate to severe.  Nearly constant.  Throbbing and dull/aching in nature.    Reports moderate leaf with injection treatments, PT, exercise.  Next    She reports most of her pain in the left back and buttock region with occasional radiation intot the left lateral hip, left anterior thigh. She denies significant pain in th eleft groin.   Review of Systems:    Review of Systems   Musculoskeletal:  Positive for arthralgias, back pain and myalgias.           Past Medical History:   Diagnosis Date    No known health problems        Past Surgical History:   Procedure Laterality Date    JOINT REPLACEMENT  02/09/2011    TONSILLECTOMY      TOTAL HIP ARTHROPLASTY Right 02/09/2011    TUBAL LIGATION         Family History   Problem Relation Age of Onset    Diabetes Mother     Heart disease Mother     Cancer Father     Breast cancer Neg Hx     Colon cancer Neg Hx     Ovarian cancer Neg Hx     Uterine cancer Neg Hx     Cervical cancer Neg Hx        Social History     Occupational History    Not on file   Tobacco Use    Smoking status: Some Days     Current packs/day: 0.25     Average packs/day: 0.3 packs/day for 35.0 years (8.8 ttl pk-yrs)     Types: Cigarettes    Smokeless tobacco: Never   Vaping  "Use    Vaping status: Never Used   Substance and Sexual Activity    Alcohol use: Never    Drug use: Never    Sexual activity: Not Currently     Partners: Male     Birth control/protection: Female Sterilization         Current Outpatient Medications:     Calcium-Magnesium-Vitamin D (CALCIUM MAGNESIUM PO), Take by mouth, Disp: , Rfl:     Collagen-Vitamin C-Biotin (Collagen 1500/C) 500-50-0.8 MG CAPS, Take by mouth, Disp: , Rfl:     KRILL OIL PO, Take 1 tablet by mouth, Disp: , Rfl:     magnesium 30 MG tablet, Take 30 mg by mouth 2 (two) times a day, Disp: , Rfl:     Multiple Vitamins-Minerals (MULTIVITAMIN ADULTS 50+ PO), Take by mouth, Disp: , Rfl:     Red Yeast Rice Extract (RED YEAST RICE PO), Take 1 tablet by mouth in the morning, Disp: , Rfl:     Turmeric 500 MG CAPS, Take by mouth, Disp: , Rfl:     Cholecalciferol (VITAMIN D3 PO), Take by mouth (Patient not taking: Reported on 5/16/2024), Disp: , Rfl:     Allergies   Allergen Reactions    Penicillins      Other reaction(s): Unknown Reaction  hives         Physical Exam:    /77   Pulse 74   Ht 5' 5\" (1.651 m)   Wt 64.4 kg (142 lb)   BMI 23.63 kg/m²     Constitutional: normal, well developed, well nourished, alert, in no distress and non-toxic and no overt pain behavior.  Eyes: anicteric  HEENT: grossly intact  Neck: supple, symmetric, trachea midline and no masses   Pulmonary:even and unlabored  Cardiovascular:No edema or pitting edema present  Skin:Normal without rashes or lesions and well hydrated  Psychiatric:Mood and affect appropriate  Neurologic:Cranial Nerves II-XII grossly intact  Musculoskeletal: only mild Tenderness palpation left SI joint.  Increased left groin pain with Stinchfield maneuver and EWA on the left    Lumbar Spine Exam    Appearance:  Normal lordosis  Palpation/Tenderness:  No significant tenderness over lumbar facet joints.  Sensory:  no sensory deficits noted  Range of Motion:  No significant pain with lumbar " extension.  Motor Strength:  Left hip flexion:  5/5  Left hip extension:  5/5  Right hip flexion:  5/5  Right hip extension:  5/5  Left knee flexion:  5/5  Left knee extension:  5/5  Right knee flexion:  5/5  Right knee extension:  5/5  Left foot dorsiflexion:  5/5  Left foot plantar flexion:  5/5  Right foot dorsiflexion:  5/5  Right foot plantar flexion:  5/5  Reflexes:  Left Patellar:  2+   Right Patellar:  2+   Left Achilles:  2+   Right Achilles:  2+   Special Tests:  Facet loading is notably negative on the left      Imaging     LUMBAR SPINE           9/12/24     INDICATION:   Sacrococcygeal disorders, not elsewhere classified.      COMPARISON: 3/18/2023.     VIEWS:  XR SPINE LUMBAR 2 OR 3 VIEWS INJURY  Images: 3     FINDINGS:     There are 5 non rib bearing lumbar vertebral bodies.      Loss of height of the L1 vertebral body is unchanged from the prior. There is no new fracture.     There is mild anterolisthesis of L4 and L5. There is mild retrolisthesis of L1 on L2.      There is moderate facet disease lower lumbar spine. Mild osteophytosis without disc space narrowing in the lumbar spine     The pedicles appear intact.     Soft tissues are unremarkable.     IMPRESSION:        Redemonstration of L1 compression fracture, similar to the prior. No new fracture seen  Mild retrolisthesis L1 on L2 and anterolisthesis L4 on  L5  Moderate facet disease lower lumbar spine.       No orders to display       No orders of the defined types were placed in this encounter.

## 2024-09-18 ENCOUNTER — OFFICE VISIT (OUTPATIENT)
Dept: PAIN MEDICINE | Facility: CLINIC | Age: 64
End: 2024-09-18
Payer: COMMERCIAL

## 2024-09-18 VITALS
WEIGHT: 142 LBS | HEIGHT: 65 IN | DIASTOLIC BLOOD PRESSURE: 77 MMHG | BODY MASS INDEX: 23.66 KG/M2 | SYSTOLIC BLOOD PRESSURE: 116 MMHG | HEART RATE: 74 BPM

## 2024-09-18 DIAGNOSIS — G89.29 CHRONIC LEFT-SIDED LOW BACK PAIN WITHOUT SCIATICA: ICD-10-CM

## 2024-09-18 DIAGNOSIS — M16.12 PRIMARY OSTEOARTHRITIS OF LEFT HIP: Primary | ICD-10-CM

## 2024-09-18 DIAGNOSIS — M54.50 CHRONIC LEFT-SIDED LOW BACK PAIN WITHOUT SCIATICA: ICD-10-CM

## 2024-09-18 DIAGNOSIS — M46.1 SACROILIITIS (HCC): ICD-10-CM

## 2024-09-18 PROCEDURE — 99204 OFFICE O/P NEW MOD 45 MIN: CPT | Performed by: STUDENT IN AN ORGANIZED HEALTH CARE EDUCATION/TRAINING PROGRAM

## 2024-09-18 NOTE — PATIENT INSTRUCTIONS
"Patient Education     Hip pain in adults   The Basics   Written by the doctors and editors at [facility]   What causes hip pain? -- Many different things can cause hip pain. Sometimes, pain is related to an injury. Other causes include:   Arthritis - This is the general term for \"inflammation or damage of the joints.\" People whose hip pain is caused by arthritis usually develop pain in the groin or thigh, slowly over time.   Bursitis - There are 3 sacs called \"bursae\" in or near the hip (figure 1). These sacs are filled with fluid. They help cushion and protect the joints. \"Bursitis\" is when 1 of these sacs gets irritated or inflamed. People whose hip pain is caused by bursitis usually feel more pain if they lie on their side, or if someone presses against the side of their hip.   Muscle or tendon strain - Three major muscle groups help move the hip. If you overuse these muscles or the tendons that attach them to your bones, it can lead to hip pain. This pain is usually worse when you move your leg in 1 particular direction.   Nerve problems - Lots of nerves pass by the hip. These nerves or nerves in the lower part of the spine can get pressed on or damaged and cause hip pain. People with pain caused by nerve problems also often feel tingling or numbness in the area. A pinched nerve in the spine could cause other problems, such as weakness in the leg.  How is hip pain treated? -- The right treatment depends on what is causing it. In general, treatments might include:   Taking medicines to reduce pain and/or inflammation   Getting a shot of a steroid medicine, which can reduce inflammation   Physical therapy or exercises recommended by the doctor  If you have severe hip arthritis, your doctor or nurse might suggest surgery to replace the hip.  What can I do on my own to feel better? -- You can:   Ask your doctor if there are stretches or exercises that can help with the cause of your pain. Before you do these " "exercises, warm up your muscles by walking or taking a warm shower or bath.   Consider taking non-prescription pain medicines, such as acetaminophen (sample brand name: Tylenol) or naproxen (sample brand name: Aleve). But if you have heart disease or other health problems, or if you are on prescription medicines, ask your doctor before you start taking any new medicines.   Use a cane, walker, shoe insert, or other device, if it helps you.   Apply a cold gel pack, bag of ice, or bag of frozen vegetables on your hip, if it helps with your pain. Do this every 1 to 2 hours, for 15 minutes each time. Put a thin towel between the ice (or other cold object) and your skin.  When should I call the doctor? -- Call for advice if:   Your pain is sudden and severe, prevents you from putting weight on that side, or is so bad that you can't rotate your leg to the side. Some people who have hip pain actually have a broken hip bone. This is especially likely after a fall or even a mild impact. Having a broken hip is serious and needs immediate medical attention.   Your hip is swollen, bruised, or bleeding.   You also have a fever of 100.4°F (38°C) or higher along with your hip pain.   You have weakness in 1 of your legs or feet.   Your toes or foot are numb or turn, blue, gray, or pale.  All topics are updated as new evidence becomes available and our peer review process is complete.  This topic retrieved from [employer] on: Apr 24, 2024.  Topic 46940 Version 15.0  Release: 32.3.2 - C32.113  © 2024 UpToDate, Inc. and/or its affiliates. All rights reserved.  figure 1: Bursae near the hip     These sacs, called \"bursae,\" are filled with fluid. They help cushion and protect the joints. \"Bursitis\" is the medical term for when 1 of these sacs gets irritated or inflamed.  Graphic 54185 Version 8.0  Consumer Information Use and Disclaimer   Disclaimer: This generalized information is a limited summary of diagnosis, treatment, and/or " medication information. It is not meant to be comprehensive and should be used as a tool to help the user understand and/or assess potential diagnostic and treatment options. It does NOT include all information about conditions, treatments, medications, side effects, or risks that may apply to a specific patient. It is not intended to be medical advice or a substitute for the medical advice, diagnosis, or treatment of a health care provider based on the health care provider's examination and assessment of a patient's specific and unique circumstances. Patients must speak with a health care provider for complete information about their health, medical questions, and treatment options, including any risks or benefits regarding use of medications. This information does not endorse any treatments or medications as safe, effective, or approved for treating a specific patient. UpToDate, Inc. and its affiliates disclaim any warranty or liability relating to this information or the use thereof.The use of this information is governed by the Terms of Use, available at https://www.[employer].com/en/know/clinical-effectiveness-terms. 2024© UpToDate, Inc. and its affiliates and/or licensors. All rights reserved.  Copyright   © 2024 UpToDate, Inc. and/or its affiliates. All rights reserved.

## 2024-09-26 ENCOUNTER — OFFICE VISIT (OUTPATIENT)
Dept: OBGYN CLINIC | Facility: CLINIC | Age: 64
End: 2024-09-26
Payer: COMMERCIAL

## 2024-09-26 VITALS
HEART RATE: 77 BPM | OXYGEN SATURATION: 97 % | DIASTOLIC BLOOD PRESSURE: 79 MMHG | HEIGHT: 65 IN | SYSTOLIC BLOOD PRESSURE: 124 MMHG | TEMPERATURE: 98 F | WEIGHT: 142 LBS | RESPIRATION RATE: 18 BRPM | BODY MASS INDEX: 23.66 KG/M2

## 2024-09-26 DIAGNOSIS — M16.12 PRIMARY OSTEOARTHRITIS OF ONE HIP, LEFT: Primary | ICD-10-CM

## 2024-09-26 PROCEDURE — 20611 DRAIN/INJ JOINT/BURSA W/US: CPT | Performed by: FAMILY MEDICINE

## 2024-09-26 RX ORDER — TRIAMCINOLONE ACETONIDE 40 MG/ML
40 INJECTION, SUSPENSION INTRA-ARTICULAR; INTRAMUSCULAR
Status: COMPLETED | OUTPATIENT
Start: 2024-09-26 | End: 2024-09-26

## 2024-09-26 RX ORDER — BUPIVACAINE HYDROCHLORIDE 2.5 MG/ML
8 INJECTION, SOLUTION INFILTRATION; PERINEURAL
Status: COMPLETED | OUTPATIENT
Start: 2024-09-26 | End: 2024-09-26

## 2024-09-26 RX ADMIN — BUPIVACAINE HYDROCHLORIDE 8 ML: 2.5 INJECTION, SOLUTION INFILTRATION; PERINEURAL at 09:30

## 2024-09-26 RX ADMIN — TRIAMCINOLONE ACETONIDE 40 MG: 40 INJECTION, SUSPENSION INTRA-ARTICULAR; INTRAMUSCULAR at 09:30

## 2024-09-26 NOTE — PROGRESS NOTES
Large joint arthrocentesis: L hip joint  Universal Protocol:  Consent: Verbal consent obtained.  Risks and benefits: risks, benefits and alternatives were discussed  Consent given by: patient  Patient identity confirmed: verbally with patient  Supporting Documentation  Indications: pain   Procedure Details  Location: hip - L hip joint  Preparation: Patient was prepped and draped in the usual sterile fashion  Needle size: 22 G  Ultrasound guidance: yes  Approach: anterolateral  Medications administered: 8 mL bupivacaine 0.25 %; 40 mg triamcinolone acetonide 40 mg/mL    Patient tolerance: patient tolerated the procedure well with no immediate complications    Risks and benefits of CSI were discussed with patient extensively. Risks were highlighted which included but were not limited to infection, pain, local site swelling, and chance that injection may not be effective. Patient was also counseled regarding glucose elevation days after receiving CSI and to be mindful of diet and check sugars daily. Patient agreeable to proceed with CSI after counseling.     US images printed and will uploaded to epic

## 2024-10-15 ENCOUNTER — TELEPHONE (OUTPATIENT)
Age: 64
End: 2024-10-15

## 2024-10-15 NOTE — TELEPHONE ENCOUNTER
Aware thanks. Since that injection is working then we can hold off on further imaging at this time

## 2024-10-15 NOTE — TELEPHONE ENCOUNTER
Caller: Patient    Doctor: Faby    Reason for call: Patient had injection by Dr Francis. She called to share her results with Dr. Hobbs as per his request.     Patient states she's doing better and is at a 2-3/10 for pain.     Call back#: [phone]

## 2024-10-15 NOTE — TELEPHONE ENCOUNTER
Caller: [place]     Doctor: Faby     Reason for call: Patient called I advise of message below patient understood.    Call back#: [phone]

## 2025-04-08 ENCOUNTER — PROCEDURE VISIT (OUTPATIENT)
Dept: OBGYN CLINIC | Facility: CLINIC | Age: 65
End: 2025-04-08
Payer: MEDICARE

## 2025-04-08 VITALS — HEIGHT: 65 IN | BODY MASS INDEX: 23.49 KG/M2 | WEIGHT: 141 LBS

## 2025-04-08 DIAGNOSIS — M16.12 PRIMARY OSTEOARTHRITIS OF ONE HIP, LEFT: Primary | ICD-10-CM

## 2025-04-08 PROCEDURE — 20611 DRAIN/INJ JOINT/BURSA W/US: CPT | Performed by: FAMILY MEDICINE

## 2025-04-08 RX ORDER — BUPIVACAINE HYDROCHLORIDE 2.5 MG/ML
8 INJECTION, SOLUTION INFILTRATION; PERINEURAL
Status: COMPLETED | OUTPATIENT
Start: 2025-04-08 | End: 2025-04-08

## 2025-04-08 RX ORDER — TRIAMCINOLONE ACETONIDE 40 MG/ML
80 INJECTION, SUSPENSION INTRA-ARTICULAR; INTRAMUSCULAR
Status: COMPLETED | OUTPATIENT
Start: 2025-04-08 | End: 2025-04-08

## 2025-04-08 RX ADMIN — TRIAMCINOLONE ACETONIDE 80 MG: 40 INJECTION, SUSPENSION INTRA-ARTICULAR; INTRAMUSCULAR at 14:30

## 2025-04-08 RX ADMIN — BUPIVACAINE HYDROCHLORIDE 8 ML: 2.5 INJECTION, SOLUTION INFILTRATION; PERINEURAL at 14:30

## 2025-04-08 NOTE — PROGRESS NOTES
Large joint arthrocentesis: L hip joint  Universal Protocol:  Consent: Verbal consent obtained.  Risks and benefits: risks, benefits and alternatives were discussed  Consent given by: patient  Patient identity confirmed: verbally with patient  Supporting Documentation  Indications: pain   Procedure Details  Location: hip - L hip joint  Preparation: Patient was prepped and draped in the usual sterile fashion  Needle size: 22 G  Ultrasound guidance: yes  Approach: anterolateral  Medications administered: 8 mL bupivacaine 0.25 %; 80 mg triamcinolone acetonide 40 mg/mL    Patient tolerance: patient tolerated the procedure well with no immediate complications    Risks and benefits of CSI were discussed with patient extensively. Risks were highlighted which included but were not limited to infection, pain, local site swelling, and chance that injection may not be effective. Patient was also counseled regarding glucose elevation days after receiving CSI and to be mindful of diet and check sugars daily. Patient agreeable to proceed with CSI after counseling.

## 2025-05-19 ENCOUNTER — TELEPHONE (OUTPATIENT)
Dept: GASTROENTEROLOGY | Facility: CLINIC | Age: 65
End: 2025-05-19

## 2025-05-19 NOTE — TELEPHONE ENCOUNTER
Spoke with the patient and let her know she is due for her repeat colonoscopy in July. She will call us back to schedule.    Last colon 7/18/22   3 yr recall   Jude

## 2025-05-29 ENCOUNTER — HOSPITAL ENCOUNTER (OUTPATIENT)
Age: 65
Discharge: HOME | End: 2025-05-29
Payer: MEDICARE

## 2025-05-29 VITALS — HEIGHT: 63 IN | BODY MASS INDEX: 24.98 KG/M2 | WEIGHT: 141 LBS

## 2025-05-29 DIAGNOSIS — Z12.31 SCREENING MAMMOGRAM FOR BREAST CANCER: ICD-10-CM

## 2025-05-29 PROCEDURE — 77063 BREAST TOMOSYNTHESIS BI: CPT

## 2025-05-29 PROCEDURE — 77067 SCR MAMMO BI INCL CAD: CPT

## 2025-06-03 ENCOUNTER — HOSPITAL ENCOUNTER (OUTPATIENT)
Dept: MAMMOGRAPHY | Facility: CLINIC | Age: 65
Discharge: HOME | End: 2025-06-03
Attending: NURSE PRACTITIONER
Payer: MEDICARE

## 2025-06-03 ENCOUNTER — HOSPITAL ENCOUNTER (OUTPATIENT)
Dept: ULTRASOUND IMAGING | Facility: CLINIC | Age: 65
Discharge: HOME | End: 2025-06-03
Attending: NURSE PRACTITIONER
Payer: MEDICARE

## 2025-06-03 VITALS — WEIGHT: 141 LBS | BODY MASS INDEX: 24.98 KG/M2 | HEIGHT: 63 IN

## 2025-06-03 DIAGNOSIS — R92.8 ABNORMAL MAMMOGRAM: ICD-10-CM

## 2025-06-03 PROCEDURE — G0279 TOMOSYNTHESIS, MAMMO: HCPCS

## 2025-06-03 PROCEDURE — 77065 DX MAMMO INCL CAD UNI: CPT

## 2025-06-03 PROCEDURE — 76642 ULTRASOUND BREAST LIMITED: CPT

## 2025-08-04 ENCOUNTER — PREP FOR PROCEDURE (OUTPATIENT)
Age: 65
End: 2025-08-04

## 2025-08-04 ENCOUNTER — TELEPHONE (OUTPATIENT)
Age: 65
End: 2025-08-04

## 2025-08-04 DIAGNOSIS — Z86.0100 HX OF COLONIC POLYPS: Primary | ICD-10-CM
